# Patient Record
Sex: FEMALE | Race: WHITE | NOT HISPANIC OR LATINO | Employment: FULL TIME | URBAN - METROPOLITAN AREA
[De-identification: names, ages, dates, MRNs, and addresses within clinical notes are randomized per-mention and may not be internally consistent; named-entity substitution may affect disease eponyms.]

---

## 2020-08-25 ENCOUNTER — APPOINTMENT (OUTPATIENT)
Dept: LAB | Age: 25
End: 2020-08-25

## 2020-08-25 ENCOUNTER — TRANSCRIBE ORDERS (OUTPATIENT)
Dept: ADMINISTRATIVE | Age: 25
End: 2020-08-25

## 2020-08-25 DIAGNOSIS — Z02.1 PHYSICAL EXAM, PRE-EMPLOYMENT: ICD-10-CM

## 2020-08-25 DIAGNOSIS — Z02.1 PHYSICAL EXAM, PRE-EMPLOYMENT: Primary | ICD-10-CM

## 2020-08-25 LAB — RUBV IGG SERPL IA-ACNC: 67.5 IU/ML

## 2020-08-25 PROCEDURE — 86762 RUBELLA ANTIBODY: CPT

## 2020-08-25 PROCEDURE — 86765 RUBEOLA ANTIBODY: CPT

## 2020-08-25 PROCEDURE — 86480 TB TEST CELL IMMUN MEASURE: CPT

## 2020-08-25 PROCEDURE — 86787 VARICELLA-ZOSTER ANTIBODY: CPT

## 2020-08-25 PROCEDURE — 86735 MUMPS ANTIBODY: CPT

## 2020-08-27 LAB
MEV IGG SER QL: NORMAL
MUV IGG SER QL: ABNORMAL
VZV IGG SER IA-ACNC: ABNORMAL

## 2020-08-28 LAB
GAMMA INTERFERON BACKGROUND BLD IA-ACNC: 0.03 IU/ML
M TB IFN-G BLD-IMP: NEGATIVE
M TB IFN-G CD4+ BCKGRND COR BLD-ACNC: -0.01 IU/ML
M TB IFN-G CD4+ BCKGRND COR BLD-ACNC: 0 IU/ML
MITOGEN IGNF BCKGRD COR BLD-ACNC: >10 IU/ML

## 2020-12-22 ENCOUNTER — IMMUNIZATIONS (OUTPATIENT)
Dept: FAMILY MEDICINE CLINIC | Facility: HOSPITAL | Age: 25
End: 2020-12-22

## 2020-12-22 DIAGNOSIS — Z23 ENCOUNTER FOR IMMUNIZATION: ICD-10-CM

## 2020-12-22 PROCEDURE — 91300 SARS-COV-2 / COVID-19 MRNA VACCINE (PFIZER-BIONTECH) 30 MCG: CPT

## 2020-12-22 PROCEDURE — 0001A SARS-COV-2 / COVID-19 MRNA VACCINE (PFIZER-BIONTECH) 30 MCG: CPT

## 2021-01-11 ENCOUNTER — TELEPHONE (OUTPATIENT)
Dept: PSYCHIATRY | Facility: CLINIC | Age: 26
End: 2021-01-11

## 2021-01-12 ENCOUNTER — IMMUNIZATIONS (OUTPATIENT)
Dept: FAMILY MEDICINE CLINIC | Facility: HOSPITAL | Age: 26
End: 2021-01-12

## 2021-01-12 DIAGNOSIS — Z23 ENCOUNTER FOR IMMUNIZATION: ICD-10-CM

## 2021-01-12 PROCEDURE — 0002A SARS-COV-2 / COVID-19 MRNA VACCINE (PFIZER-BIONTECH) 30 MCG: CPT

## 2021-01-12 PROCEDURE — 91300 SARS-COV-2 / COVID-19 MRNA VACCINE (PFIZER-BIONTECH) 30 MCG: CPT

## 2021-02-25 ENCOUNTER — HOSPITAL ENCOUNTER (EMERGENCY)
Facility: HOSPITAL | Age: 26
Discharge: HOME | End: 2021-02-25
Attending: EMERGENCY MEDICINE
Payer: COMMERCIAL

## 2021-02-25 VITALS
OXYGEN SATURATION: 100 % | RESPIRATION RATE: 20 BRPM | WEIGHT: 250 LBS | HEART RATE: 90 BPM | DIASTOLIC BLOOD PRESSURE: 86 MMHG | SYSTOLIC BLOOD PRESSURE: 164 MMHG | TEMPERATURE: 98.7 F

## 2021-02-25 DIAGNOSIS — F32.A DEPRESSION: Primary | ICD-10-CM

## 2021-02-25 DIAGNOSIS — F41.9 ANXIETY: ICD-10-CM

## 2021-02-25 LAB
ATRIAL RATE: 104 BPM
P AXIS: 42 DEGREES
PR INTERVAL: 136 MS
QRS AXIS: 21 DEGREES
QRSD INTERVAL: 92 MS
QT INTERVAL: 326 MS
QTC INTERVAL: 428 MS
T WAVE AXIS: 57 DEGREES
VENTRICULAR RATE: 104 BPM

## 2021-02-25 PROCEDURE — 99284 EMERGENCY DEPT VISIT MOD MDM: CPT | Performed by: EMERGENCY MEDICINE

## 2021-02-25 PROCEDURE — 99284 EMERGENCY DEPT VISIT MOD MDM: CPT

## 2021-02-25 PROCEDURE — 93005 ELECTROCARDIOGRAM TRACING: CPT

## 2021-02-25 PROCEDURE — 93010 ELECTROCARDIOGRAM REPORT: CPT | Performed by: INTERNAL MEDICINE

## 2021-02-25 RX ORDER — DIAZEPAM 2 MG/1
2 TABLET ORAL ONCE
Status: COMPLETED | OUTPATIENT
Start: 2021-02-25 | End: 2021-02-25

## 2021-02-25 RX ORDER — ACETAMINOPHEN 325 MG/1
650 TABLET ORAL ONCE
Status: DISCONTINUED | OUTPATIENT
Start: 2021-02-25 | End: 2021-02-25 | Stop reason: HOSPADM

## 2021-02-25 RX ADMIN — DIAZEPAM 2 MG: 2 TABLET ORAL at 09:16

## 2021-02-25 NOTE — ED NOTES
Pt reports feeling sad and anxious on a daily basis  No previous psych hx  Pt lives with her girlfriend and works full time  She denies any D&A use  Pt denies si, hi or hallucinations  Pt is interested in outpatient psychiatry and therapy  Will provide her with outpatient psych resources  Pt is agreeable to return to the ed if she has any thoughts of self harm
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
## 2021-04-12 ENCOUNTER — TELEPHONE (OUTPATIENT)
Dept: PSYCHIATRY | Facility: CLINIC | Age: 26
End: 2021-04-12

## 2021-05-04 ENCOUNTER — TELEPHONE (OUTPATIENT)
Dept: PSYCHIATRY | Facility: CLINIC | Age: 26
End: 2021-05-04

## 2021-05-04 NOTE — TELEPHONE ENCOUNTER
Pt called looking to set up w/ a psychiatrist  I informed pt  call KALINA Braswell to put in a referral in the sytem   Once in I will forward information to Intake

## 2021-05-05 ENCOUNTER — OFFICE VISIT (OUTPATIENT)
Dept: INTERNAL MEDICINE CLINIC | Facility: CLINIC | Age: 26
End: 2021-05-05

## 2021-05-05 ENCOUNTER — TELEPHONE (OUTPATIENT)
Dept: PSYCHIATRY | Facility: CLINIC | Age: 26
End: 2021-05-05

## 2021-05-05 ENCOUNTER — PATIENT OUTREACH (OUTPATIENT)
Dept: INTERNAL MEDICINE CLINIC | Facility: CLINIC | Age: 26
End: 2021-05-05

## 2021-05-05 VITALS
SYSTOLIC BLOOD PRESSURE: 115 MMHG | WEIGHT: 259.8 LBS | TEMPERATURE: 97.4 F | DIASTOLIC BLOOD PRESSURE: 76 MMHG | HEART RATE: 86 BPM | BODY MASS INDEX: 43.28 KG/M2 | HEIGHT: 65 IN

## 2021-05-05 DIAGNOSIS — Z13.1 SCREENING FOR DIABETES MELLITUS: ICD-10-CM

## 2021-05-05 DIAGNOSIS — E66.01 CLASS 3 SEVERE OBESITY DUE TO EXCESS CALORIES WITH BODY MASS INDEX (BMI) OF 40.0 TO 44.9 IN ADULT, UNSPECIFIED WHETHER SERIOUS COMORBIDITY PRESENT (HCC): ICD-10-CM

## 2021-05-05 DIAGNOSIS — F32.2 CURRENT SEVERE EPISODE OF MAJOR DEPRESSIVE DISORDER WITHOUT PSYCHOTIC FEATURES, UNSPECIFIED WHETHER RECURRENT (HCC): Primary | ICD-10-CM

## 2021-05-05 DIAGNOSIS — Z78.9 NEED FOR COMMUNITY RESOURCE: ICD-10-CM

## 2021-05-05 PROBLEM — F32.9 MAJOR DEPRESSIVE DISORDER WITH CURRENT ACTIVE EPISODE: Status: ACTIVE | Noted: 2021-05-05

## 2021-05-05 PROBLEM — A49.02 MRSA INFECTION: Status: ACTIVE | Noted: 2021-05-05

## 2021-05-05 PROBLEM — F90.9 ADHD: Status: ACTIVE | Noted: 2021-05-05

## 2021-05-05 PROBLEM — F41.9 SEVERE ANXIETY: Status: ACTIVE | Noted: 2021-05-05

## 2021-05-05 PROCEDURE — 99203 OFFICE O/P NEW LOW 30 MIN: CPT | Performed by: INTERNAL MEDICINE

## 2021-05-05 RX ORDER — PAROXETINE 10 MG/1
10 TABLET, FILM COATED ORAL DAILY
Qty: 30 TABLET | Refills: 2 | Status: SHIPPED | OUTPATIENT
Start: 2021-05-05 | End: 2021-06-30 | Stop reason: DRUGHIGH

## 2021-05-05 RX ORDER — PAROXETINE 10 MG/1
10 TABLET, FILM COATED ORAL DAILY
Qty: 30 TABLET | Refills: 2 | Status: SHIPPED | OUTPATIENT
Start: 2021-05-05 | End: 2021-05-05

## 2021-05-05 NOTE — PROGRESS NOTES
ASIYA had received an in person referral from Alondra Chris DO in regards to outpatient mental health services for this patient  Dr Holly Jeffries asked ASIYA to meet with the patient in person  Dr Holly Jeffries informed ASIYA the patient had been in the ER on 2/25 for depression and has been trying to get into [address] [place] [address] E [phone]  ASIYA had completed a chart review  As per patient's chart, a referral for psychiatry needs to be made by Dr Holly BRADEN informed Dr Holly Jeffries about the need for a referral for psychiatry  Dr Holly Jeffries stated he would make the referral     ASIYA had met with the patient in person  ASIYA had asked the patient how she was doing  Patient stated she is doing okay  ASIYA informed the patient that Dr Holly Jeffries will be putting in a referral for psychiatry so this should help her out with scheduling with [facility]  ALYSHA offered to call with the patient in the office to ensure this referral for services is complete  Patient is agreeable  ALYSHA had called [address] [place] [address] E, Intake Rep [phone] for services  ALYSHA spoke with Rose who took down the patient's information for services  Rose stated she would send an in basket message to intake and they will follow up with the referral for services  Patient had asked ALYSHA for a printed out list of other [address] [place] [address] E locations  ALYSHA had printed out the list of the other offices  ALYSHA had wrote down the contact information to [facility] [phone] as they also take the [employer]  [facility] provided the patient with the number to [address] if she is needs immediate help  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

## 2021-05-05 NOTE — TELEPHONE ENCOUNTER
Marissa Vizcaino called and would like to set up an apt for Sariah she said she just got out of the ED and is looking to set up a follow up with us can you please give Shaina Ma a call thank you

## 2021-05-05 NOTE — PROGRESS NOTES
INTERNAL MEDICINE OFFICE VISIT  [facility]  [address] [place], [place] [address]    NAME: Nick Orozco  AGE: 27 y o  SEX: female    DATE OF ENCOUNTER: 5/5/2021    Assessment and Plan     1  Current severe episode of major depressive disorder without psychotic features, unspecified whether recurrent ([address] )  The patient has active severe MDD with suicidal ideation, and needs urgent psychiatric assistance  I will begin  Antidepressant treatment with plans for very close follow-up  The clinic's social workers will also be closely involved with this case  - Ambulatory referral to Psychiatry-->social work provided her with numbers to follow-up and establish care  - Initiating PARoxetine (PAXIL) 10 mg QD given its favorable adverse effect profile--> will follow-up in 5 weeks to see how she is feeling and uptitrate the medication if necessary  ·  the patient was informed that paroxetine is teratogenic; she has no desires of becoming pregnant and is okay with this  -  Crisis hotline number was provided to the patient if she ever needs emergent help    2  Class 3 severe obesity due to excess calories with body mass index (BMI) of 40 0 to 44 9 in adult, unspecified whether serious comorbidity present [facility])  The patient has attempted dietary modifications in the past, but has had difficulties in maintaining them and losing weight  She would like more resources and is open to considering bariatric surgery if eligible  - Ambulatory referral to Weight Management; Future    3  Screening for diabetes mellitus    The patient requested to be screened for diabetes  She is at higher risk diabetes given family history and likely metabolic syndrome  Will screen with hemoglobin A1c and follow-up  - HEMOGLOBIN A1C W/ EAG ESTIMATION; Future    4   Need for community resource    Considering her difficulties in establishing care with a psychiatrist, I will have her meet with our clinic social work staff help assist her moving forward  - Ambulatory referral to social work care management program  - Crisis hotline number was provided to the patient; Social workers are planning on reaching out to her by phone regularly over the next few days to weeks to followup    Orders Placed This Encounter   Procedures    HEMOGLOBIN A1C W/ [address] Ambulatory referral to social work care management program    Ambulatory referral to Weight Management    Ambulatory referral to Psychiatry     Depression Screening Follow-up Plan: Patient's depression screening was positive with a PHQ-2 score of 6  Their PHQ-9 score was 24  Continue regular follow-up with their psychologist/therapist/psychiatrist who is managing their mental health condition(s)  Patient with active suicidal ideations  Patient was kept calm in the office as 911 was called to take patient to the hospital for crisis evaluation  Patient advised to follow-up with PCP for further management  Chief Complaint     Chief Complaint   Patient presents with    New Patient Visit       History of Present Illness      The patient is a 23-year-old female with a PMH of ADHD and MRSA infection when she was a child who presents to the clinic to establish as a new patient  Her biggest issue currently is severe depression  Over the past several months, her depression has become to the point that it interferes with her daily life and she has passive suicidal thoughts involving the end of her life without any plan or inclination of acting it out  She denies experiencing any auditory hallucinations, paranoia, or psychotic features  There is a large and severe anxiety component that she experiences as well  She recently was seen in the ED in February, but deferred being admitted to inpatient psychiatry with the hope of following outpatient    Since then she has had no success in establishing care with a psychiatrist, often hitting difficulties of providers being fully booked or her having to wait for 3 months or more  She is also concerned about her weight, she has trouble maintaining her diet and would like more options to help achieve a better BMI  She has a history of ADHD, but has not been on any medications since she was a child  She used the be a chronic tobacco smoker, but quit 1 month ago and uses a vaping pen as an oral fixation aide to help curb her cravings  (the pens do not have any nicotine in them)  The following portions of the patient's history were reviewed and updated as appropriate: allergies, current medications, past family history, past medical history, past social history, past surgical history and problem list     Review of Systems     Review of Systems   Constitutional: Negative for chills and fever  HENT: Negative for ear pain and sore throat  Eyes: Negative for pain and visual disturbance  Respiratory: Negative for cough and shortness of breath  Cardiovascular: Negative for chest pain and palpitations  Gastrointestinal: Negative for abdominal pain and vomiting  Genitourinary: Negative for dysuria and hematuria  Musculoskeletal: Negative for arthralgias and back pain  Skin: Negative for color change and rash  Neurological: Negative for seizures and syncope  Psychiatric/Behavioral: Positive for decreased concentration, dysphoric mood, sleep disturbance and suicidal ideas  Negative for agitation, behavioral problems, confusion, hallucinations and self-injury  The patient is nervous/anxious  The patient is not hyperactive  All other systems reviewed and are negative        Active Problem List     Patient Active Problem List   Diagnosis    Major depressive disorder with current active episode    Class 3 severe obesity in adult ([facility] [address] )    Severe anxiety    ADHD    MRSA infection       Objective     /76 (BP Location: Left arm, Patient Position: Sitting, Cuff Size: Standard)   Pulse 86   Temp (!) 97 4 °F (36 3 °C) (Temporal)   Ht 5' 5" (1 651 m)   Wt 118 kg (259 lb 12 8 oz)   BMI 43 23 kg/m²     Physical Exam  Vitals signs reviewed  Constitutional:       General: She is not in acute distress  Appearance: Normal appearance  She is obese  She is not ill-appearing  HENT:      Head: Normocephalic and atraumatic  Nose: No congestion  Mouth/Throat:      Mouth: Mucous membranes are moist       Pharynx: Oropharynx is clear  Eyes:      General: No scleral icterus  Conjunctiva/sclera: Conjunctivae normal    Neck:      Musculoskeletal: Neck supple  No muscular tenderness  Cardiovascular:      Rate and Rhythm: Normal rate and regular rhythm  Pulses: Normal pulses  Heart sounds: No murmur  No gallop  Pulmonary:      Effort: Pulmonary effort is normal       Breath sounds: Normal breath sounds  Abdominal:      General: Bowel sounds are normal  There is no distension  Palpations: Abdomen is soft  Tenderness: There is no abdominal tenderness  Musculoskeletal:         General: No swelling or tenderness  Right lower leg: No edema  Left lower leg: No edema  Lymphadenopathy:      Cervical: No cervical adenopathy  Skin:     General: Skin is warm  Coloration: Skin is not pale  Findings: No erythema or rash  Neurological:      General: No focal deficit present  Mental Status: She is alert and oriented to person, place, and time  Psychiatric:         Mood and Affect: Mood is depressed  Affect is labile  Behavior: Behavior normal          Thought Content: Thought content includes suicidal (very passive, has thoughts of life ending but never thinks of her acting on it nor a plan) ideation  Thought content does not include homicidal or suicidal plan           Judgment: Judgment normal          Pertinent Laboratory/Diagnostic Studies:  CBC: No results found for: WBC, RBC, HGB, HCT, MCV, MCH, MCHC, RDW, MPV, PLT, NRBC, NEUTOPHILPCT, LYMPHOPCT, MONOPCT, EOSPCT, BASOPCT, NEUTROABS, LYMPHSABS, MONOSABS, EOSABS, MONOSABS  Chemistry Profile: No results found for: NA, K, CL, CO2, ANIONGAP, BUN, CREATININE, GLUC, GLUF, GLUCOSE, CALCIUM, CORRECTEDCA, MG, PHOS, AST, ALT, ALKPHOS, PROT, BILITOT, EGFR  Coagulation Studies: No results found for: PROTIME, INR, PTT  Cardiac Studies: No results found for: NTBNP, BNP, TROPONINI, POCTROP  Hepatology: No results found for: LIPASE, AMMONIA, AFP  Endocrine Studies: No results found for: HGBA1C, [id number], T3FREE, L0YPHIX, FREET4, THYMICROANTI, THGAB, TRIG, CHOL, CHOLESTEROL, HDL, LDLC, LDLCALC, LDL, LDLDIRECT, NONHDLC, [id number], PTH, [id number]  Iron Studies: No results found for: LABIRON, IRON, TIBC, FERRITIN  Health Maintenance: No results found for: PSA, HEPCAB  Toxicology: No results found for: AMPHETUR, BARBTUR, BDZUR, COCAINEUR, COCAINEUR, OPIATEUR, PCPUR, THCUR, ETOH, ACTMNPHEN, SALICYLATE      Current Medications     Current Outpatient Medications:     PARoxetine (PAXIL) 10 mg tablet, Take 1 tablet (10 mg total) by mouth daily, Disp: 30 tablet, Rfl: 2    Health Maintenance     Health Maintenance   Topic Date Due    Pneumococcal Vaccine: Pediatrics (0 to 5 Years) and At-Risk Patients (6 to 59 Years) (1 of 1 - PPSV23) Never done    Depression Remission PHQ  Never done    HIV Screening  Never done    BMI: Followup Plan  Never done    Annual Physical  Never done    DTaP,Tdap,and Td Vaccines (1 - Tdap) Never done    Cervical Cancer Screening  Never done    BMI: Adult  05/05/2022    Influenza Vaccine  Completed    COVID-19 Vaccine  Completed    HIB Vaccine  Aged Out    Hepatitis B Vaccine  Aged Out    IPV Vaccine  Aged Out    Hepatitis A Vaccine  Aged Out    Meningococcal ACWY Vaccine  Aged Out    HPV Vaccine  Aged Out     Immunization History   Administered Date(s) Administered    Influenza, injectable, quadrivalent, preservative free 0 5 mL 11/23/2020    SARS-CoV-2 / COVID-19 mRNA IM ([employer]) 12/22/2020, 01/12/2021       Elias Garcia DO  Internal Medicine  PGY-1  5/5/2021 4:57 PM

## 2021-05-07 ENCOUNTER — TELEPHONE (OUTPATIENT)
Dept: PSYCHIATRY | Facility: CLINIC | Age: 26
End: 2021-05-07

## 2021-05-07 NOTE — TELEPHONE ENCOUNTER
Behavorial Health Outpatient Intake Questions    Referred by: PCP    Please advised interviewee that they need to answer all questions truthfully to allow for best care and any misrepresentations of information may affect their ability to be seen at this clinic   => Was this discussed? Yes     [facility]     Presenting Problem (in patient's words):   Patient states has been struggling with severe depression and anxiety  Are there any developmental disabilities? ? If yes, can they speak to you on the phone? If they are too limited to speak to you on phone, refer out Yes   ADHD (at a younger age)    Are you taking any psychiatric medications? Yes    => If yes, who prescribes? If yes, are they injectable medications? PARoxetine (PAXIL) 10 mg tablet       Does the patient have a language barrier or hearing impairment? No    Have you been [facility] at [facility] by a therapist or a doctor in the past? If yes, who? No    Has the patient been hospitalized for mental health? No   If yes, how long ago was last hospitalization and where was it? Do you actively use alcohol or marijuana or illegal substances? If yes, what and how much - refer out to Drug and alcohol treatment if use is excessive or daily use of illegal substances No concerns of substance abuse are reported  Do you have a community treatment team or ? No    Legal History-     Does the patient have any history of arrests, longterm/custodial time, or DUIs? No  If Yes-  1) What types of charges? 2) When were they last incarcerated? 3) Are they currently on parole or probation? Minor Child-    Who has custody of the child? Is there a custody agreement? If there is a custody agreement remind parent that they must bring a copy to the first appt or they will not be seen       Intake Team, please check with provider before scheduling if flags come up such as:  - complex case  - legal history (other than ANA)  - communication barrier concerns are present  - if, in your judgment, this needs further review    ACCEPTED as a patient Yes  => Appointment Date: 06/30/2021 at 3:00 p m with Rivka Coma    Referred Elsewhere? No    Name of Insurance Co: [address] [place] [address] ID# [id number]  [id number] Phone #  If ins is primary or secondary  If patient is a minor, parents information such as Name, D  O B of guarantor

## 2021-05-10 ENCOUNTER — APPOINTMENT (OUTPATIENT)
Dept: LAB | Facility: HOSPITAL | Age: 26
End: 2021-05-10
Payer: COMMERCIAL

## 2021-05-10 ENCOUNTER — TRANSCRIBE ORDERS (OUTPATIENT)
Dept: LAB | Facility: HOSPITAL | Age: 26
End: 2021-05-10

## 2021-05-10 DIAGNOSIS — Z13.1 SCREENING FOR DIABETES MELLITUS: ICD-10-CM

## 2021-05-10 DIAGNOSIS — N92.4 PREMENOPAUSAL MENORRHAGIA: Primary | ICD-10-CM

## 2021-05-10 DIAGNOSIS — L73.2 HIDRADENITIS: ICD-10-CM

## 2021-05-10 DIAGNOSIS — N92.4 PREMENOPAUSAL MENORRHAGIA: ICD-10-CM

## 2021-05-10 LAB
ALBUMIN SERPL BCP-MCNC: 3.2 G/DL (ref 3.5–5)
ALP SERPL-CCNC: 65 U/L (ref 46–116)
ALT SERPL W P-5'-P-CCNC: 27 U/L (ref 12–78)
ANION GAP SERPL CALCULATED.3IONS-SCNC: 6 MMOL/L (ref 4–13)
AST SERPL W P-5'-P-CCNC: 12 U/L (ref 5–45)
BASOPHILS # BLD AUTO: 0.03 THOUSANDS/ΜL (ref 0–0.1)
BASOPHILS NFR BLD AUTO: 0 % (ref 0–1)
BILIRUB SERPL-MCNC: 0.28 MG/DL (ref 0.2–1)
BUN SERPL-MCNC: 12 MG/DL (ref 5–25)
CALCIUM ALBUM COR SERPL-MCNC: 9.3 MG/DL (ref 8.3–10.1)
CALCIUM SERPL-MCNC: 8.7 MG/DL (ref 8.3–10.1)
CHLORIDE SERPL-SCNC: 109 MMOL/L (ref 100–108)
CO2 SERPL-SCNC: 25 MMOL/L (ref 21–32)
CREAT SERPL-MCNC: 0.69 MG/DL (ref 0.6–1.3)
EOSINOPHIL # BLD AUTO: 0.27 THOUSAND/ΜL (ref 0–0.61)
EOSINOPHIL NFR BLD AUTO: 3 % (ref 0–6)
ERYTHROCYTE [DISTWIDTH] IN BLOOD BY AUTOMATED COUNT: 12.2 % (ref 11.6–15.1)
EST. AVERAGE GLUCOSE BLD GHB EST-MCNC: 91 MG/DL
FSH SERPL-ACNC: 5.8 MIU/ML
GFR SERPL CREATININE-BSD FRML MDRD: 119 ML/MIN/1.73SQ M
GLUCOSE P FAST SERPL-MCNC: 108 MG/DL (ref 65–99)
HBA1C MFR BLD: 4.8 %
HCT VFR BLD AUTO: 40.8 % (ref 34.8–46.1)
HGB BLD-MCNC: 13.6 G/DL (ref 11.5–15.4)
IMM GRANULOCYTES # BLD AUTO: 0.03 THOUSAND/UL (ref 0–0.2)
IMM GRANULOCYTES NFR BLD AUTO: 0 % (ref 0–2)
LH SERPL-ACNC: 10.6 MIU/ML
LYMPHOCYTES # BLD AUTO: 2.81 THOUSANDS/ΜL (ref 0.6–4.47)
LYMPHOCYTES NFR BLD AUTO: 32 % (ref 14–44)
MCH RBC QN AUTO: 29.3 PG (ref 26.8–34.3)
MCHC RBC AUTO-ENTMCNC: 33.3 G/DL (ref 31.4–37.4)
MCV RBC AUTO: 88 FL (ref 82–98)
MONOCYTES # BLD AUTO: 0.48 THOUSAND/ΜL (ref 0.17–1.22)
MONOCYTES NFR BLD AUTO: 6 % (ref 4–12)
NEUTROPHILS # BLD AUTO: 5.11 THOUSANDS/ΜL (ref 1.85–7.62)
NEUTS SEG NFR BLD AUTO: 59 % (ref 43–75)
NRBC BLD AUTO-RTO: 0 /100 WBCS
PLATELET # BLD AUTO: 332 THOUSANDS/UL (ref 149–390)
PMV BLD AUTO: 9.8 FL (ref 8.9–12.7)
POTASSIUM SERPL-SCNC: 3.8 MMOL/L (ref 3.5–5.3)
PROLACTIN SERPL-MCNC: 8.9 NG/ML
PROT SERPL-MCNC: 6.6 G/DL (ref 6.4–8.2)
RBC # BLD AUTO: 4.64 MILLION/UL (ref 3.81–5.12)
SODIUM SERPL-SCNC: 140 MMOL/L (ref 136–145)
WBC # BLD AUTO: 8.73 THOUSAND/UL (ref 4.31–10.16)

## 2021-05-10 PROCEDURE — 83001 ASSAY OF GONADOTROPIN (FSH): CPT

## 2021-05-10 PROCEDURE — 80053 COMPREHEN METABOLIC PANEL: CPT

## 2021-05-10 PROCEDURE — 82154 ANDROSTANEDIOL GLUCURONIDE: CPT

## 2021-05-10 PROCEDURE — 82627 DEHYDROEPIANDROSTERONE: CPT

## 2021-05-10 PROCEDURE — 84270 ASSAY OF SEX HORMONE GLOBUL: CPT

## 2021-05-10 PROCEDURE — 83036 HEMOGLOBIN GLYCOSYLATED A1C: CPT

## 2021-05-10 PROCEDURE — 84403 ASSAY OF TOTAL TESTOSTERONE: CPT

## 2021-05-10 PROCEDURE — 85025 COMPLETE CBC W/AUTO DIFF WBC: CPT

## 2021-05-10 PROCEDURE — 83002 ASSAY OF GONADOTROPIN (LH): CPT

## 2021-05-10 PROCEDURE — 84146 ASSAY OF PROLACTIN: CPT

## 2021-05-10 PROCEDURE — 83498 ASY HYDROXYPROGESTERONE 17-D: CPT

## 2021-05-10 PROCEDURE — 84402 ASSAY OF FREE TESTOSTERONE: CPT

## 2021-05-10 PROCEDURE — 82157 ASSAY OF ANDROSTENEDIONE: CPT

## 2021-05-10 PROCEDURE — 36415 COLL VENOUS BLD VENIPUNCTURE: CPT

## 2021-05-11 ENCOUNTER — PATIENT OUTREACH (OUTPATIENT)
Dept: INTERNAL MEDICINE CLINIC | Facility: CLINIC | Age: 26
End: 2021-05-11

## 2021-05-11 LAB
DHEA-S SERPL-MCNC: 206 UG/DL (ref 84.8–378)
SHBG SERPL-SCNC: 30.6 NMOL/L (ref 24.6–122)
TESTOST FREE SERPL-MCNC: 0.7 PG/ML (ref 0–4.2)
TESTOST SERPL-MCNC: 18 NG/DL (ref 13–71)

## 2021-05-11 NOTE — PROGRESS NOTES
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

## 2021-05-14 LAB — ANDROST SERPL-MCNC: 54 NG/DL (ref 41–262)

## 2021-05-16 LAB — 17OHP SERPL-MCNC: 11 NG/DL

## 2021-05-18 LAB — 3A-DIOL G SER-MCNC: 265 NG/DL

## 2021-05-26 ENCOUNTER — HOSPITAL ENCOUNTER (EMERGENCY)
Facility: HOSPITAL | Age: 26
Discharge: HOME | End: 2021-05-26
Attending: EMERGENCY MEDICINE
Payer: COMMERCIAL

## 2021-05-26 VITALS
RESPIRATION RATE: 18 BRPM | OXYGEN SATURATION: 97 % | BODY MASS INDEX: 42.61 KG/M2 | HEIGHT: 65 IN | WEIGHT: 255.73 LBS | DIASTOLIC BLOOD PRESSURE: 58 MMHG | HEART RATE: 65 BPM | SYSTOLIC BLOOD PRESSURE: 117 MMHG | TEMPERATURE: 97.9 F

## 2021-05-26 DIAGNOSIS — S61.419A HAND LACERATION: Primary | ICD-10-CM

## 2021-05-26 PROCEDURE — 99282 EMERGENCY DEPT VISIT SF MDM: CPT

## 2021-05-26 PROCEDURE — 99282 EMERGENCY DEPT VISIT SF MDM: CPT | Performed by: EMERGENCY MEDICINE

## 2021-05-26 NOTE — ED ATTENDING ATTESTATION
Final Diagnosis:  1  Hand laceration           IOpal MD, saw and evaluated the patient  All available labs and X-rays were ordered by me or the resident and have been reviewed by myself  I discussed the patient with the resident / non-physician and agree with the resident's / non-physician practitioner's findings and plan as documented in the resident's / non-physician practicitioner's note, except where noted  At this point, I agree with the current assessment done in the ED  I was present during key portions of all procedures performed unless otherwise stated  Chief Complaint   Patient presents with    Wrist Laceration     Patient reports that over the weekend she cut her right wrist on a roshni bedframe; reports that the wound is not healing well and bending her wrist is very painful     This is a 27 y o  female presenting for evaluation of laceration  She on the weekend was taking apart a bed frame  Had suffered superficial laceration of the RIGHT wrist    4cm long  Not gaping  Healing by itself  She's covering it with neosporin  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 so came in for evaluation  No f/ch/n/v  No hx of cellulitis  Tetanus is up to date     PMH:   has no past medical history on file  PSH:   has no past surgical history on file      Social:  Social History     Substance and Sexual Activity   Alcohol Use Yes    Comment: occ     Social History     Tobacco Use   Smoking Status Current Every Day Smoker    Packs/day: 0 50    Types: Cigarettes   Smokeless Tobacco Never Used   Tobacco Comment    stoped smoking cigarettes 1 month ago     Social History     Substance and Sexual Activity   Drug Use Yes    Types: Marijuana    Comment: occ     PE:  Vitals:    05/26/21 1223   BP: 117/58   BP Location: Right arm   Pulse: 65   Resp: 18   Temp: 97 9 °F (36 6 °C)   TempSrc: Tympanic   SpO2: 97%   Weight: 116 kg (255 lb 11 7 oz)   Height: 5' 5" (1 651 m)   General: VS reviewed  Appears in NAD  awake, alert  Well-nourished, well-developed  Appears stated age  Speaking normally in full sentences  Head: Normocephalic, atraumatic  Eyes: EOM-I  No diplopia  No hyphema  No subconjunctival hemorrhages  Symmetrical lids  ENT: Atraumatic external nose and ears  MMM  No malocclusion  No stridor  Normal phonation  No drooling  Normal swallowing  Neck: No JVD  CV: No pallor noted  Lungs:   No tachypnea  No respiratory distress  MSK:   FROM spontaneously  Skin: Dry  Laceration is present though healing by secondary intent with scab overlying  Localized redness exactly at the incision site  Ranging wrist normally  Not hot to the touch  Neuro: Awake, alert, GCS15, CN II-XII grossly intact  Motor grossly intact  Psychiatric/Behavioral: Appropriate mood and affect   Exam: deferred  A:  - wound check  P:  - supportive care  - advised keeping covered  - DC     - 13 point ROS was performed and all are normal unless stated in the history above  - Nursing note reviewed  Vitals reviewed  - Orders placed by myself and/or advanced practitioner / resident     - Previous chart was reviewed  - No language barrier    - History obtained from patient  - There are no limitations to the history obtained  - Critical care time: Not applicable for this patient  Code Status: No Order  Advance Directive and Living Will:      Power of :    POLST:      Medications - No data to display  No orders to display     No orders of the defined types were placed in this encounter      Labs Reviewed - No data to display  Time reflects when diagnosis was documented in both MDM as applicable and the Disposition within this note     Time User Action Codes Description Comment    5/26/2021 12:45 PM Cole Cano Add [A07 030L] Hand laceration     5/26/2021 12:45 PM Cole Cano Modify [X47 [id number]] Hand laceration right      ED Disposition     ED Disposition Condition Date/Time Comment Discharge Stable Wed May 26, 2021 12:44 PM Oval Smoke discharge to home/self care  Follow-up Information     Follow up With Specialties Details Why Contact Info Additional Information    Infolink  Call  As needed to establish primary care [facility] Internal Medicine Call  As needed, to establish primary care [address] [facility] [address] [facility] [address]  [facility] [address], [address] [address], [facility], [place], [place], [address] [phone]        Patient's Medications   Discharge Prescriptions    No medications on file     No discharge procedures on file  Prior to Admission Medications   Prescriptions Last Dose Informant Patient Reported? Taking? PARoxetine (PAXIL) 10 mg tablet   No No   Sig: Take 1 tablet (10 mg total) by mouth daily      Facility-Administered Medications: None       Portions of the record may have been created with voice recognition software  Occasional wrong word or "sound a like" substitutions may have occurred due to the inherent limitations of voice recognition software  Read the chart carefully and recognize, using context, where substitutions have occurred      Electronically signed by:  Eitan Nguyen

## 2021-05-26 NOTE — ED PROVIDER NOTES
History  Chief Complaint   Patient presents with    Wrist Laceration     Patient reports that over the weekend she cut her right wrist on a roshni bedframe; reports that the wound is not healing well and bending her wrist is very painful     26 y/o female presents to the ED c/o right wrist laceration sustained 2-3 days ago  She states that she was helping a friend move when she accidentally cut her right wrist on a roshni bedframe  She notes that the wound does not appear to be healing well and causes discomfort when she bends her wrist  No other injuries or complaints  Tetanus up to date  Prior to Admission Medications   Prescriptions Last Dose Informant Patient Reported? Taking? PARoxetine (PAXIL) 10 mg tablet   No No   Sig: Take 1 tablet (10 mg total) by mouth daily      Facility-Administered Medications: None       History reviewed  No pertinent past medical history  History reviewed  No pertinent surgical history  Family History   Problem Relation Age of Onset    No Known Problems Mother     Prostate cancer Father     Diabetes Father     Depression Father     Anxiety disorder Father     Depression Sister     Anxiety disorder Sister      I have reviewed and agree with the history as documented  E-Cigarette/Vaping    E-Cigarette Use Current Every Day User      E-Cigarette/Vaping Substances    Nicotine No     THC No     CBD No     Flavoring Yes     Other No     Unknown No      Social History     Tobacco Use    Smoking status: Current Every Day Smoker     Packs/day: 0 50     Types: Cigarettes    Smokeless tobacco: Never Used    Tobacco comment: stoped smoking cigarettes 1 month ago   Substance Use Topics    Alcohol use: Yes     Comment: occ    Drug use: Yes     Types: Marijuana     Comment: occ        Review of Systems   Constitutional: Negative for chills and fever  Musculoskeletal: Negative for back pain, joint swelling and neck pain     Skin: Positive for wound (Right wrist)  Negative for rash  Neurological: Negative for weakness and numbness  All other systems reviewed and are negative  Physical Exam  ED Triage Vitals [05/26/21 1223]   Temperature Pulse Respirations Blood Pressure SpO2   97 9 °F (36 6 °C) 65 18 117/58 97 %      Temp Source Heart Rate Source Patient Position - Orthostatic VS BP Location FiO2 (%)   Tympanic Monitor Sitting Right arm --      Pain Score       7             Orthostatic Vital Signs  Vitals:    05/26/21 1223   BP: 117/58   Pulse: 65   Patient Position - Orthostatic VS: Sitting       Physical Exam  Vitals signs and nursing note reviewed  Constitutional:       General: She is not in acute distress  Appearance: Normal appearance  She is normal weight  HENT:      Head: Normocephalic and atraumatic  Right Ear: External ear normal       Left Ear: External ear normal       Nose: Nose normal    Eyes:      Extraocular Movements: Extraocular movements intact  Pupils: Pupils are equal, round, and reactive to light  Neck:      Musculoskeletal: Normal range of motion and neck supple  Cardiovascular:      Comments: Appears well perfused  2+ radial pulses bilaterally  Pulmonary:      Effort: Pulmonary effort is normal  No respiratory distress  Abdominal:      General: Abdomen is flat  There is no distension  Musculoskeletal: Normal range of motion  General: No swelling or tenderness  Comments: 3 cm superficial laceration to the proximal palm of the right hand, just distal to the right wrist  The wound appears several days old, currently healing, with small faint erythema surrounding the wound edges  No bleeding or discharge  Neurovascularly intact  FROM of the right wrist without joint effusion  Skin:     General: Skin is warm and dry  Capillary Refill: Capillary refill takes less than 2 seconds  Neurological:      General: No focal deficit present        Mental Status: She is alert and oriented to person, place, and time  Psychiatric:         Mood and Affect: Mood normal          Behavior: Behavior normal          ED Medications  Medications - No data to display    Diagnostic Studies  Results Reviewed     None                 No orders to display         Procedures  Procedures      ED Course                                       MDM  Number of Diagnoses or Management Options  Hand laceration:   Diagnosis management comments: 26 y/o female presents to the ED c/o right wrist laceration sustained 2-3 days ago  She states that she was helping a friend move when she accidentally cut her right wrist on a roshni bedframe  She notes that the wound does not appear to be healing well and causes discomfort when she bends her wrist  No other injuries or complaints  Tetanus up to date  On exam she is afebrile, VSS, with a 3 cm superficial laceration to the proximal palm of the right hand, just distal to the right wrist  The wound appears several days old, currently healing, with small faint erythema surrounding the wound edges  No bleeding or discharge  Neurovascularly intact  FROM of the right wrist without joint effusion  Remainder of exam is non-contributory  Old superficial wound, no indications to repair  Tetanus up to date  Discussed findings, red flags, return precautions, and outpatient follow up and the patient understands and agrees  Disposition  Final diagnoses:   Hand laceration - right     Time reflects when diagnosis was documented in both MDM as applicable and the Disposition within this note     Time User Action Codes Description Comment    5/26/2021 12:45 PM Peewee Hearn Add [K80 963I] Hand laceration     5/26/2021 12:45 PM Peewee Hearn Modify [[id number]] Hand laceration right      ED Disposition     ED Disposition Condition Date/Time Comment    Discharge Stable Wed May 26, 2021 12:44 PM Any Cordon discharge to home/self care              Follow-up Information     Follow up With Specialties Details Why Contact Info Additional Information    Infolink  Call  As needed to establish primary care [facility] Call  As needed, to establish primary care [address] [address]  [facility] [address], [address] [place] [address], [place], [place], [address] [phone]          Discharge Medication List as of 5/26/2021 12:46 PM      CONTINUE these medications which have NOT CHANGED    Details   PARoxetine (PAXIL) 10 mg tablet Take 1 tablet (10 mg total) by mouth daily, Starting Wed 5/5/2021, Normal           No discharge procedures on file  PDMP Review     None           ED Provider  Attending physically available and evaluated Daphnie Montaño I managed the patient along with the ED Attending      Electronically Signed by         Graciela Winters MD  06/01/21 5299

## 2021-06-30 ENCOUNTER — TELEPHONE (OUTPATIENT)
Dept: PSYCHIATRY | Facility: CLINIC | Age: 26
End: 2021-06-30

## 2021-06-30 ENCOUNTER — OFFICE VISIT (OUTPATIENT)
Dept: PSYCHIATRY | Facility: CLINIC | Age: 26
End: 2021-06-30
Payer: COMMERCIAL

## 2021-06-30 DIAGNOSIS — F32.2 CURRENT SEVERE EPISODE OF MAJOR DEPRESSIVE DISORDER WITHOUT PSYCHOTIC FEATURES, UNSPECIFIED WHETHER RECURRENT (HCC): ICD-10-CM

## 2021-06-30 PROCEDURE — 90791 PSYCH DIAGNOSTIC EVALUATION: CPT | Performed by: NURSE PRACTITIONER

## 2021-06-30 RX ORDER — PAROXETINE HYDROCHLORIDE 20 MG/1
30 TABLET, FILM COATED ORAL DAILY
Qty: 45 TABLET | Refills: 1 | Status: SHIPPED | OUTPATIENT
Start: 2021-06-30 | End: 2021-08-02 | Stop reason: DRUGHIGH

## 2021-06-30 NOTE — BH TREATMENT PLAN
TREATMENT PLAN (Medication Management Only)        [facility]    Name and Date of Birth:  Lizzie Acosta 27 y o  1995  Date of Treatment Plan: June 30, 2021  Diagnosis/Diagnoses:    1  Current severe episode of major depressive disorder without psychotic features, unspecified whether recurrent [facility])      Strengths/Personal Resources for Self-Care: taking medications as prescribed, motivation for treatment  Area/Areas of need (in own words): unstable mood  1  Long Term Goal: alleviate depression  Target Date:6 months - 12/30/2021  Person/Persons responsible for completion of goal: Sariah  2  Short Term Objective (s) - How will we reach this goal?:   A  Provider new recommended medication/dosage changes and/or continue medication(s): continue current medications as prescribed Paxil  B  Attend medication management appointments regularly  C  See therapist   Target Date:6 months - 12/30/2021  Person/Persons Responsible for Completion of Goal: Sariah  Progress Towards Goals: starting treatment  Treatment Modality: medication management every 4 weeks, referral for individual psychotherapy, medication education at every visit  Review due 180 days from date of this plan: 6 months - 12/30/2021  Expected length of service: ongoing treatment  My Physician/PA/NP and I have developed this plan together and I agree to work on the goals and objectives  I understand the treatment goals that were developed for my treatment
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
## 2021-08-02 ENCOUNTER — OFFICE VISIT (OUTPATIENT)
Dept: PSYCHIATRY | Facility: CLINIC | Age: 26
End: 2021-08-02
Payer: COMMERCIAL

## 2021-08-02 DIAGNOSIS — F32.2 CURRENT SEVERE EPISODE OF MAJOR DEPRESSIVE DISORDER WITHOUT PSYCHOTIC FEATURES, UNSPECIFIED WHETHER RECURRENT (HCC): Primary | ICD-10-CM

## 2021-08-02 DIAGNOSIS — F41.9 SEVERE ANXIETY: ICD-10-CM

## 2021-08-02 PROCEDURE — 99214 OFFICE O/P EST MOD 30 MIN: CPT | Performed by: NURSE PRACTITIONER

## 2021-08-02 RX ORDER — PAROXETINE HYDROCHLORIDE 40 MG/1
40 TABLET, FILM COATED ORAL EVERY MORNING
Qty: 30 TABLET | Refills: 2 | Status: SHIPPED | OUTPATIENT
Start: 2021-08-02 | End: 2021-09-15 | Stop reason: DRUGHIGH

## 2021-08-02 NOTE — PSYCH
Regular Visit    Problem List Items Addressed This Visit        Other    Major depressive disorder with current active episode - Primary    Relevant Medications    PARoxetine (PAXIL) 40 MG tablet    Severe anxiety             Encounter provider SHAUNA Carrera    Provider located at   [address] [address] [phone]    Recent Visits  No visits were found meeting these conditions  Showing recent visits within past 7 days and meeting all other requirements  Future Appointments  No visits were found meeting these conditions  Showing future appointments within next 150 days and meeting all other requirements       HPI     Current Outpatient Medications   Medication Sig Dispense Refill    PARoxetine (PAXIL) 40 MG tablet Take 1 tablet (40 mg total) by mouth every morning 30 tablet 2     No current facility-administered medications for this visit  Review of Systems    I spent 30 minutes directly with the patient during this visit      [address] [place] [address]    Name and Date of Birth:  Bryan Mcgregor 27 y o  1995 MRN: [id number]    Date of Visit: August 2, 2021    No Known Allergies  SUBJECTIVE:    Debi Landers is seen today for a follow up for Major Depressive Disorder  She continues to improve since the last visit  Sariah fritz seen by this provider on 06/30/2021  She is bright, cooperative in conversation  She reports that she is doing good and she has noticed a good change in her mood  She relates that she has joined a [employer] recently as well and is on a diet  She is still eating, but is controlling what she eats  She also feels more rested and has been sleeping more, getting at least 8 hours per night  She does continue to have some restlessness and has noticed some anxiety    When she does become anxious, her leg chucho  She reports that her depression as 6/10, anxiety is 5/10  She has been more able to handle her stress and has noticed that she does not argue as much and has decreased irritability  She does have negative thoughts when she becomes anxious, which she would like to continue to work on decreasing  A referral was made for therapy she is continuing to wait on a call  We will increase her Paxil to 40 mg p o  daily  Follow-up  in 6 weeks  She denies any side effects from current psychiatric medications  PLAN:  Increase Paxil to 40mg PO daily  Follow up in 6 weeks  She will call if concerns or issues arise prior to scheduled appointment  Aware of 24 hour and weekend coverage for urgent situations accessed by calling [facility] main practice number  Referral for individual psychotherapy  Medication management every 6 weeks  Aware of need to follow up with family physician for medical issues    Diagnoses and all orders for this visit:    Current severe episode of major depressive disorder without psychotic features, unspecified whether recurrent (HCC)  -     PARoxetine (PAXIL) 40 MG tablet; Take 1 tablet (40 mg total) by mouth every morning    Severe anxiety        Current Outpatient Medications on File Prior to Visit   Medication Sig Dispense Refill    [DISCONTINUED] PARoxetine (PAXIL) 20 mg tablet Take 1 5 tablets (30 mg total) by mouth daily 45 tablet 1     No current facility-administered medications on file prior to visit  Psychotherapy Provided:     Individual psychotherapy provided: Yes  Supportive counseling provided  Medication changes discussed with Sariah  Medication education provided to NARGIS SCHWARZ  Coping skills reviewed with Sariah  Educated on importance of medication and treatment compliance  Reassurance and supportive therapy provided  Crisis/safety plan discussed with Sariah DAS Appetite Changes and Sleep:     She reports adequate number of sleep hours (8 hours), decreased appetite, exercising and dieting, normal energy level   Patient denies suicidal or homicidal ideation    Review Of Systems:    HPI ROS:               Medication Side Effects:  denies     Depression (10 worst): 6/10 (Was increased)   Anxiety (10 worst): 5/10 (Was increased)   Safety concerns (SI, HI, etc): denies (Was passive SI)   Sleep: 8 hours/night (Was 11 hrs/night)   Energy: good (Was low)   Appetite: decreased (Was increased)     General normal    Personality no change in personality   Constitutional negative   ENT negative   Cardiovascular negative   Respiratory negative   Gastrointestinal negative   Genitourinary negative   Musculoskeletal negative   Integumentary negative   Neurological negative   Endocrine negative   Other Symptoms none, all other systems are negative     Mental Status Evaluation:    Appearance Adequate hygiene and grooming   Behavior calm and cooperative and friendly   Mood euthymic  Depression Scale - 6 of 10 (0 = No depression)  Anxiety Scale - 5 of 10 (0 = No anxiety)   Speech Normal rate and volume   Affect mood-congruent   Thought Processes Goal directed and coherent   Thought Content Does not verbalize delusional material   Associations Tightly connected   Perceptual Disturbances Denies hallucinations and does not appear to be responding to internal stimuli   Risk Potential Suicidal/Homicidal Ideation - No evidence of suicidal or homicidal ideation and patient does not verbalize suicidal or homicidal ideation  Risk of Violence - No evidence of risk for violence found on assessment  Risk of Self Mutilation - No evidence of risk for self mutilation found on assessment   Orientation oriented to person, place, time/date and situation   Memory recent and remote memory grossly intact   Consciousness alert and awake   Attention/Concentration attention span and concentration are age appropriate   Insight intact   Judgement intact   Muscle Strength and Gait normal muscle strength and normal muscle tone, normal gait/station and normal balance   Motor Activity no abnormal movements   Language no difficulty naming common objects, no difficulty repeating a phrase, no difficulty writing a sentence   Fund of Knowledge adequate knowledge of current events  adequate fund of knowledge regarding past history  adequate fund of knowledge regarding vocabulary      Past Psychiatric History Update:     Inpatient Psychiatric Admission Since Last Encounter:   no  Changes to Outpatient Psychiatric Treatment Team:    no  Suicide Attempt Or Self Mutilation Since Last Encounter:   no  Incidence of Violent Behavior Since Last Encounter:   no    Traumatic History Update:     New Onset of Abuse Since Last Encounter:   no  Traumatic Events Since Last Encounter:   no    Past Medical History:    Past Medical History:   Diagnosis Date    ADHD (attention deficit hyperactivity disorder)      Past Medical History Pertinent Negatives:   Diagnosis Date Noted    Head injury 2021    [facility]    [facility] attempt ([facility] ) 2021     History reviewed  No pertinent surgical history    No Known Allergies  Substance Abuse History:    Social History     Substance and Sexual Activity   Alcohol Use Yes    Alcohol/week: 1 0 standard drinks    Types: 1 Shots of liquor per week    Comment: one drink/week     Social History     Substance and Sexual Activity   Drug Use Yes    Frequency: 21 0 times per week    Types: Marijuana    Comment: smokes 3-4 times/day     Social History:    Social History     Socioeconomic History    Marital status: Single     Spouse name: Not on file    Number of children: 0    Years of education: some college    Highest education level: 12th grade   Occupational History    Not on file   Tobacco Use    Smoking status: Former Smoker     Packs/day: 0 50     Types: Cigarettes     Quit date: 3/30/2021     Years since quittin 3    Smokeless tobacco: Never Used    Tobacco comment: stoped smoking cigarettes 1 month ago   Vaping Use    Vaping Use: Some days    Substances: Flavoring   Substance and Sexual Activity    Alcohol use: Yes     Alcohol/week: 1 0 standard drinks     Types: 1 Shots of liquor per week     Comment: one drink/week    Drug use: Yes     Frequency: 21 0 times per week     Types: Marijuana     Comment: smokes 3-4 times/day    Sexual activity: Not Currently   Other Topics Concern    Not on file   Social History Narrative    Not on file     Social Determinants of Health     Financial Resource Strain: Low Risk     Difficulty of Paying Living Expenses: Not hard at all   Food Insecurity: No Food Insecurity    Worried About Running Out of Food in the Last Year: Never true    Jesusita of Food in the Last Year: Never true   Transportation Needs: No Transportation Needs    Lack of Transportation (Medical): No    Lack of Transportation (Non-Medical): No   Physical Activity: Inactive    Days of Exercise per Week: 0 days    Minutes of Exercise per Session: 0 min   Stress: Stress Concern Present    Feeling of Stress : To some extent   Social Connections:     Frequency of Communication with Friends and Family:     Frequency of Social Gatherings with Friends and Family:     Attends Worship Services:     Active Member of Clubs or Organizations:     Attends Club or Organization Meetings:     Marital Status:    Intimate Partner Violence: Not At Risk    Fear of Current or Ex-Partner: No    Emotionally Abused: No    Physically Abused: No    Sexually Abused: No     Family Psychiatric History:     Family History   Problem Relation Age of Onset    No Known Problems Mother     Prostate cancer Father     Diabetes Father     Depression Father     Anxiety disorder Father     Depression Sister     Anxiety disorder Sister      History Review: The following portions of the patient's history were reviewed and updated as appropriate: allergies, current medications, past family history, past medical history, past social history, past surgical history and problem list     OBJECTIVE:     Vital signs in last 24 hours: There were no vitals filed for this visit  Laboratory Results:   Recent Labs (last 2 months):   No visits with results within 2 Month(s) from this visit     Latest known visit with results is:   Appointment on 05/10/2021   Component Date Value    Hemoglobin A1C 05/10/2021 4 8     EAG 05/10/2021 91     WBC 05/10/2021 8 73     RBC 05/10/2021 4 64     Hemoglobin 05/10/2021 13 6     Hematocrit 05/10/2021 40 8     MCV 05/10/2021 88     MCH 05/10/2021 29 3     MCHC 05/10/2021 33 3     RDW 05/10/2021 12 2     MPV 05/10/2021 9 8     Platelets 38/58/9240 332     nRBC 05/10/2021 0     Neutrophils Relative 05/10/2021 59     Immat GRANS % 05/10/2021 0     Lymphocytes Relative 05/10/2021 32     Monocytes Relative 05/10/2021 6     Eosinophils Relative 05/10/2021 3     Basophils Relative 05/10/2021 0     Neutrophils Absolute 05/10/2021 5 11     Immature Grans Absolute 05/10/2021 0 03     Lymphocytes Absolute 05/10/2021 2 81     Monocytes Absolute 05/10/2021 0 48     Eosinophils Absolute 05/10/2021 0 27     Basophils Absolute 05/10/2021 0 03     Sodium 05/10/2021 140     Potassium 05/10/2021 3 8     Chloride 05/10/2021 109*    CO2 05/10/2021 25     ANION GAP 05/10/2021 6     BUN 05/10/2021 12     Creatinine 05/10/2021 0 69     Glucose, Fasting 05/10/2021 108*    Calcium 05/10/2021 8 7     Corrected Calcium 05/10/2021 9 3     AST 05/10/2021 12     ALT 05/10/2021 27     Alkaline Phosphatase 05/10/2021 65     Total Protein 05/10/2021 6 6     Albumin 05/10/2021 3 2*    Total Bilirubin 05/10/2021 0 28     eGFR 05/10/2021 119     Androstenedione 05/10/2021 54     Testosterone, Free 05/10/2021 0 7     TESTOSTERONE TOTAL 05/10/2021 18     DHEA-SO4 05/10/2021 206 0     Prolactin 05/10/2021 8 9     17-OH PROGESTERONE 05/10/2021 11     LH 05/10/2021 10 6     FSH 05/10/2021 5 8     Sex Hormone Binding 05/10/2021 30 6     3 Alpha-17B Androstenedi* 05/10/2021 265*     I have personally reviewed all pertinent laboratory/tests results  Suicide/Homicide Risk Assessment:    Risk of Harm to Self:  The following ratings are based on assessment at the time of the interview  Protective Factors: no current suicidal ideation, access to mental health treatment, compliant with medications, compliant with mental health treatment, having a desire to be alive, stable living environment, stable job  Based on today's Audkassidy Diaz presents the following risk of harm to self: minimal    Risk of Harm to Others: The following ratings are based on assessment at the time of the interview  Protective Factors: no current homicidal ideation  Based on today's assessment, Mirella Lerma presents the following risk of harm to others: none    The following interventions are recommended: no intervention changes needed    Medications Risks/Benefits:      Risks, Benefits And Possible Side Effects Of Medications:    Discussed risks and benefits of treatment with patient including risk of suicidality, serotonin syndrome and SIADH related to treatment with antidepressants; Risk of induction of manic symptoms in certain patient populations     Controlled Medication Discussion:     No records found for controlled prescriptions according to [place] Prescription Drug Monitoring Program    Treatment Plan:    Due for update/Updated:   no  Last treatment plan done 6/30/21 by SHAUNA Law  Treatment Plan due on 12/30/21  SHAUNA Tyler 08/02/21    This note was shared with patient

## 2021-09-10 ENCOUNTER — APPOINTMENT (OUTPATIENT)
Dept: LAB | Facility: HOSPITAL | Age: 26
End: 2021-09-10

## 2021-09-10 DIAGNOSIS — Z00.8 HEALTH EXAMINATION IN POPULATION SURVEY: ICD-10-CM

## 2021-09-10 LAB
CHOLEST SERPL-MCNC: 143 MG/DL (ref 50–200)
EST. AVERAGE GLUCOSE BLD GHB EST-MCNC: 91 MG/DL
HBA1C MFR BLD: 4.8 %
HDLC SERPL-MCNC: 43 MG/DL
LDLC SERPL CALC-MCNC: 68 MG/DL (ref 0–100)
NONHDLC SERPL-MCNC: 100 MG/DL
TRIGL SERPL-MCNC: 158 MG/DL

## 2021-09-10 PROCEDURE — 80061 LIPID PANEL: CPT

## 2021-09-10 PROCEDURE — 36415 COLL VENOUS BLD VENIPUNCTURE: CPT

## 2021-09-10 PROCEDURE — 83036 HEMOGLOBIN GLYCOSYLATED A1C: CPT

## 2021-09-15 ENCOUNTER — TELEMEDICINE (OUTPATIENT)
Dept: PSYCHIATRY | Facility: CLINIC | Age: 26
End: 2021-09-15
Payer: COMMERCIAL

## 2021-09-15 DIAGNOSIS — F32.2 CURRENT SEVERE EPISODE OF MAJOR DEPRESSIVE DISORDER WITHOUT PSYCHOTIC FEATURES, UNSPECIFIED WHETHER RECURRENT (HCC): Primary | ICD-10-CM

## 2021-09-15 PROCEDURE — 99214 OFFICE O/P EST MOD 30 MIN: CPT | Performed by: NURSE PRACTITIONER

## 2021-09-15 RX ORDER — TRAZODONE HYDROCHLORIDE 50 MG/1
25 TABLET ORAL
Qty: 30 TABLET | Refills: 0 | Status: SHIPPED | OUTPATIENT
Start: 2021-09-15

## 2021-09-15 RX ORDER — PAROXETINE 30 MG/1
30 TABLET, FILM COATED ORAL EVERY MORNING
Qty: 30 TABLET | Refills: 1 | Status: SHIPPED | OUTPATIENT
Start: 2021-09-15 | End: 2022-02-04 | Stop reason: DRUGHIGH

## 2021-09-15 NOTE — PSYCH
Virtual Regular Visit    Verification of patient location:    Patient is located in the following state in which I hold an active license [place]    Problem List Items Addressed This Visit        Other    Major depressive disorder with current active episode - Primary    Relevant Medications    PARoxetine (PAXIL) 30 mg tablet    traZODone (DESYREL) 50 mg tablet             Encounter provider SHAUNA Raygoza    Provider located at   [address] [address] [phone]    Recent Visits  No visits were found meeting these conditions  Showing recent visits within past 7 days and meeting all other requirements  Future Appointments  No visits were found meeting these conditions  Showing future appointments within next 150 days and meeting all other requirements         The patient was identified by name and date of birth  Gini Arenas was informed that this is a telemedicine visit and that the visit is being conducted [employer] and patient was informed that this is a secure, HIPAA-compliant platform  She agrees to proceed     My office door was closed  No one else was in the room  She acknowledged consent and understanding of privacy and security of the video platform  The patient has agreed to participate and understands they can discontinue the visit at any time  Patient is aware this is a billable service  HPI     Current Outpatient Medications   Medication Sig Dispense Refill    PARoxetine (PAXIL) 30 mg tablet Take 1 tablet (30 mg total) by mouth every morning 30 tablet 1    traZODone (DESYREL) 50 mg tablet Take 0 5 tablets (25 mg total) by mouth daily at bedtime 30 tablet 0     No current facility-administered medications for this visit  Review of Systems  Video Exam    There were no vitals filed for this visit      Physical Exam   As a result of this visit, I have referred the patient for further respiratory evaluation  No    I spent 25 minutes directly with the patient during this visit  [address] [facility] acknowledges that she has consented to an online visit or consultation  She understands that the online visit is based solely on information provided by her, and that, in the absence of a face-to-face physical evaluation by the physician, the diagnosis she receives is both limited and provisional in terms of accuracy and completeness  This is not intended to replace a full medical face-to-face evaluation by the physician  Mann Fuller understands and accepts these terms  MEDICATION MANAGEMENT NOTE        [facility]    Name and Date of Birth:  Mann Fuller 27 y o  1995 MRN: [id number]    Date of Visit: September 15, 2021    No Known Allergies  SUBJECTIVE:    Agata Clement is seen today for a follow up for Major Depressive Disorder  She continues to experience on and off anxiety symptoms since the last visit  Sariah last seen by this provider on 8/2/21  At that time her paxil was increased to 40mg PO daily  Today she reports that she "does not think the 40mg of paxil is right" and states that she has been more irritable and both she and her partner have noticed a difference in her attitude  She states that her sleep fluctuates and she could get 3-4 hours or she could get 12 hours, but she always has trouble falling asleep due to anxiety and racing thoughts  She states it takes her approximately 2 hours to fall asleep most nights  She states that she did try some trazodone provided to her by her father, which helps her to fall asleep but she also feels groggy in the morning after taking it  She did tell this provider that she is taking 100mg of the trazodone    We discussed the need to speak to her provider prior to changing her medications, and this provider let her know that she should have started out at a smaller dose, such as 25mg PO HS  She was in agreement and will trial the trazodone 25mg to start  She reports that she believes her depression at this time is related to her situation with her girlfriend  She states that she is thinking about leaving her, and it makes her happy to imagine her life in that capacity  She currently rates her depression at 7/10 and anxiety at 10/10  If anxiety continues, we will investigate supplementing the paxil with buspar, and will reassess at next appointment  She denies SI and HI, denies auditory or visual hallucinations  She reports work is going very well  She is staying active and walking approximately 2 hours/day, which she states makes her feel better and helps her sleep better  We will reduce paxil back to 30mg PO daily and initiate trazodone 25mg PO HS  She will follow up in mid-October, as she has a vacation planned in the beginning of October  She denies any side effects from current psychiatric medications  PLAN:  Reduce paxil to 30mg PO daily  Initiate trazodone 25mg PO HS  Follow up in one month  She will call sooner if concerns or issues arise prior to scheduled appointment  She has a referral in for a therapist and is awaiting a call   Aware of 24 hour and weekend coverage for urgent situations accessed by calling [facility] main practice number  Referral for individual psychotherapy  Medication management every 4 weeks  Aware of need to follow up with family physician for medical issues    Diagnoses and all orders for this visit:    Current severe episode of major depressive disorder without psychotic features, unspecified whether recurrent (HCC)  -     PARoxetine (PAXIL) 30 mg tablet; Take 1 tablet (30 mg total) by mouth every morning  -     traZODone (DESYREL) 50 mg tablet;  Take 0 5 tablets (25 mg total) by mouth daily at bedtime        Current Outpatient Medications on File Prior to Visit   Medication Sig Dispense Refill    [DISCONTINUED] PARoxetine (PAXIL) 40 MG tablet Take 1 tablet (40 mg total) by mouth every morning 30 tablet 2     No current facility-administered medications on file prior to visit  Psychotherapy Provided:     Individual psychotherapy provided: Yes  Supportive counseling provided  Medication changes discussed with Sariah  Medication education provided to NARGIS SCHWARZ  Recent stressor including relationship problems discussed with Sariah  Importance of medication and treatment compliance reviewed with Sariah  Importance of follow up with family physician for medical issues reviewed with Sariah  Reassurance and supportive therapy provided  Crisis/safety plan discussed with Sariah  HPI ROS Appetite Changes and Sleep:     She reports difficulty falling asleep, normal appetite, normal energy level   Patient denies suicidal or homicidal ideation    Review Of Systems:    HPI ROS:               Medication Side Effects:  denies     Depression (10 worst): 7/10 (Was 6/10)   Anxiety (10 worst): 10/10 (Was 5/10)   Safety concerns (SI, HI, etc): denies (Was denies)   Sleep: Difficulty initiating, fluctuating sleep patterns (Was 8 hrs/night)   Energy: good (Was good)   Appetite: Good, making healthier decisions (Was decreased)     General normal    Personality change in personality, irritability, possibly from the paxil increased dose   Constitutional negative   ENT negative   Cardiovascular negative   Respiratory negative   Gastrointestinal negative   Genitourinary negative   Musculoskeletal negative   Integumentary negative   Neurological negative   Endocrine negative   Other Symptoms none, all other systems are negative     Mental Status Evaluation:    Appearance Adequate hygiene and grooming   Behavior calm and cooperative and friendly   Mood anxious and depressed  Depression Scale - 7 of 10 (0 = No depression)  Anxiety Scale - 10 of 10 (0 = No anxiety)   Speech Normal rate and volume   Affect appropriate and mood-congruent   Thought Processes Goal directed and coherent   Thought Content Does not verbalize delusional material   Associations Tightly connected   Perceptual Disturbances Denies hallucinations and does not appear to be responding to internal stimuli   Risk Potential Suicidal/Homicidal Ideation - No evidence of suicidal or homicidal ideation and patient does not verbalize suicidal or homicidal ideation  Risk of Violence - No evidence of risk for violence found on assessment  Risk of Self Mutilation - No evidence of risk for self mutilation found on assessment   Orientation oriented to person, place, time/date and situation   Memory recent and remote memory grossly intact   Consciousness alert and awake   Attention/Concentration attention span and concentration are age appropriate   Insight intact   Judgement partial   Muscle Strength and Gait normal muscle strength and normal muscle tone, normal gait/station and normal balance   Motor Activity unable to assess today due to virtual visit   Language no difficulty naming common objects, no difficulty repeating a phrase, no difficulty writing a sentence   Fund of Knowledge adequate knowledge of current events  adequate fund of knowledge regarding past history  adequate fund of knowledge regarding vocabulary      Past Psychiatric History Update:     Inpatient Psychiatric Admission Since Last Encounter:   no  Changes to Outpatient Psychiatric Treatment Team:    no  Suicide Attempt Or Self Mutilation Since Last Encounter:   no  Incidence of Violent Behavior Since Last Encounter:   no    Traumatic History Update:     New Onset of Abuse Since Last Encounter:   no  Traumatic Events Since Last Encounter:   no    Past Medical History:    Past Medical History:   Diagnosis Date    ADHD (attention deficit hyperactivity disorder)      Past Medical History Pertinent Negatives:   Diagnosis Date Noted    Head injury 06/30/2021    [facility]    [facility] attempt ([facility] ) 2021     No past surgical history on file  No Known Allergies  Substance Abuse History:    Social History     Substance and Sexual Activity   Alcohol Use Yes    Alcohol/week: 1 0 standard drinks    Types: 1 Shots of liquor per week    Comment: one drink/week     Social History     Substance and Sexual Activity   Drug Use Yes    Frequency: 21 0 times per week    Types: Marijuana    Comment: smokes 3-4 times/day     Social History:    Social History     Socioeconomic History    Marital status: Single     Spouse name: Not on file    Number of children: 0    Years of education: some college    Highest education level: 12th grade   Occupational History    Not on file   Tobacco Use    Smoking status: Former Smoker     Packs/day: 0 50     Types: Cigarettes     Quit date: 3/30/2021     Years since quittin 4    Smokeless tobacco: Never Used    Tobacco comment: stoped smoking cigarettes 1 month ago   Vaping Use    Vaping Use: Some days    Substances: Flavoring   Substance and Sexual Activity    Alcohol use: Yes     Alcohol/week: 1 0 standard drinks     Types: 1 Shots of liquor per week     Comment: one drink/week    Drug use: Yes     Frequency: 21 0 times per week     Types: Marijuana     Comment: smokes 3-4 times/day    Sexual activity: Not Currently   Other Topics Concern    Not on file   Social History Narrative    Not on file     Social Determinants of Health     Financial Resource Strain: Low Risk     Difficulty of Paying Living Expenses: Not hard at all   Food Insecurity: No Food Insecurity    Worried About Running Out of Food in the Last Year: Never true    Jesusita of Food in the Last Year: Never true   Transportation Needs: No Transportation Needs    Lack of Transportation (Medical): No    Lack of Transportation (Non-Medical):  No   Physical Activity: Inactive    Days of Exercise per Week: 0 days    Minutes of Exercise per Session: 0 min Stress: Stress Concern Present    Feeling of Stress : To some extent   Social Connections:     Frequency of Communication with Friends and Family:     Frequency of Social Gatherings with Friends and Family:     Attends [employer] Services:     Active Member of Clubs or Organizations:     Attends Club or Organization Meetings:     Marital Status:    Intimate Partner Violence: Not At Risk    Fear of Current or Ex-Partner: No    Emotionally Abused: No    Physically Abused: No    Sexually Abused: No     Family Psychiatric History:     Family History   Problem Relation Age of Onset    No Known Problems Mother     Prostate cancer Father     Diabetes Father     Depression Father     Anxiety disorder Father     Depression Sister     Anxiety disorder Sister      History Review: The following portions of the patient's history were reviewed and updated as appropriate: allergies, current medications, past family history, past medical history, past social history, past surgical history and problem list     OBJECTIVE:     Vital signs in last 24 hours: There were no vitals filed for this visit  Laboratory Results:   Recent Labs (last 2 months):   Appointment on 09/10/2021   Component Date Value    Hemoglobin A1C 09/10/2021 4 8     EAG 09/10/2021 91     Cholesterol 09/10/2021 143     Triglycerides 09/10/2021 158*    HDL, Direct 09/10/2021 43     LDL Calculated 09/10/2021 68     Non-HDL-Chol (CHOL-HDL) 09/10/2021 100      I have personally reviewed all pertinent laboratory/tests results      Suicide/Homicide Risk Assessment:    Risk of Harm to Self:  The following ratings are based on assessment at the time of the interview  Protective Factors: no current suicidal ideation, access to mental health treatment, compliant with medications, compliant with mental health treatment, having a desire to be alive  Based on today's assessment, Sparkle Lechuga presents the following risk of harm to self: none    Risk of Harm to Others: The following ratings are based on assessment at the time of the interview  Protective Factors: no current homicidal ideation  Based on today's assessment, Paul Rodarte presents the following risk of harm to others: none    The following interventions are recommended: no intervention changes needed    Medications Risks/Benefits:      Risks, Benefits And Possible Side Effects Of Medications:    Discussed risks and benefits of treatment with patient including risk of suicidality, serotonin syndrome and SIADH related to treatment with antidepressants; Risk of induction of manic symptoms in certain patient populations and risk of impaired next-day mental alertness, complex sleep-related behavior and dependence related to treatment with hypnotic medications     Controlled Medication Discussion:     No records found for controlled prescriptions according to [facility]    Treatment Plan:    Due for update/Updated:   no  Last treatment plan done 6/30/21 by SHAUNA Davison  Treatment Plan due on 12/30/21  SHAUNA Davalos 09/15/21    This note was shared with patient

## 2021-10-19 ENCOUNTER — OFFICE VISIT (OUTPATIENT)
Dept: FAMILY MEDICINE CLINIC | Facility: CLINIC | Age: 26
End: 2021-10-19
Payer: COMMERCIAL

## 2021-10-19 VITALS
HEART RATE: 88 BPM | WEIGHT: 251 LBS | SYSTOLIC BLOOD PRESSURE: 120 MMHG | RESPIRATION RATE: 16 BRPM | TEMPERATURE: 99.5 F | HEIGHT: 64 IN | DIASTOLIC BLOOD PRESSURE: 80 MMHG | BODY MASS INDEX: 42.85 KG/M2

## 2021-10-19 DIAGNOSIS — F41.9 SEVERE ANXIETY: ICD-10-CM

## 2021-10-19 DIAGNOSIS — F32.2 CURRENT SEVERE EPISODE OF MAJOR DEPRESSIVE DISORDER WITHOUT PSYCHOTIC FEATURES, UNSPECIFIED WHETHER RECURRENT (HCC): ICD-10-CM

## 2021-10-19 DIAGNOSIS — Z12.4 SCREENING FOR CERVICAL CANCER: ICD-10-CM

## 2021-10-19 DIAGNOSIS — Z23 ENCOUNTER FOR IMMUNIZATION: ICD-10-CM

## 2021-10-19 DIAGNOSIS — Z00.00 ANNUAL PHYSICAL EXAM: Primary | ICD-10-CM

## 2021-10-19 DIAGNOSIS — E66.01 MORBID OBESITY WITH BMI OF 40.0-44.9, ADULT (HCC): ICD-10-CM

## 2021-10-19 PROCEDURE — 90686 IIV4 VACC NO PRSV 0.5 ML IM: CPT

## 2021-10-19 PROCEDURE — 90471 IMMUNIZATION ADMIN: CPT

## 2021-10-19 PROCEDURE — 99385 PREV VISIT NEW AGE 18-39: CPT | Performed by: FAMILY MEDICINE

## 2021-12-02 ENCOUNTER — TELEPHONE (OUTPATIENT)
Dept: FAMILY MEDICINE CLINIC | Facility: CLINIC | Age: 26
End: 2021-12-02

## 2021-12-13 ENCOUNTER — OFFICE VISIT (OUTPATIENT)
Dept: BARIATRICS | Facility: CLINIC | Age: 26
End: 2021-12-13

## 2021-12-13 VITALS
SYSTOLIC BLOOD PRESSURE: 100 MMHG | WEIGHT: 252 LBS | DIASTOLIC BLOOD PRESSURE: 72 MMHG | HEIGHT: 65 IN | HEART RATE: 82 BPM | TEMPERATURE: 98.2 F | BODY MASS INDEX: 41.99 KG/M2

## 2021-12-13 DIAGNOSIS — Z01.818 PREOPERATIVE CLEARANCE: Primary | ICD-10-CM

## 2021-12-13 DIAGNOSIS — E66.01 MORBID OBESITY WITH BMI OF 40.0-44.9, ADULT (HCC): ICD-10-CM

## 2021-12-13 DIAGNOSIS — Z72.0 NICOTINE ABUSE: ICD-10-CM

## 2021-12-13 DIAGNOSIS — Z01.812 BLOOD TESTS PRIOR TO TREATMENT OR PROCEDURE: ICD-10-CM

## 2021-12-13 DIAGNOSIS — E66.01 MORBID OBESITY (HCC): Primary | ICD-10-CM

## 2021-12-13 DIAGNOSIS — Z72.0 TOBACCO ABUSE: ICD-10-CM

## 2021-12-13 PROCEDURE — RECHECK

## 2021-12-14 ENCOUNTER — TELEPHONE (OUTPATIENT)
Dept: FAMILY MEDICINE CLINIC | Facility: CLINIC | Age: 26
End: 2021-12-14

## 2021-12-14 ENCOUNTER — TELEPHONE (OUTPATIENT)
Dept: PSYCHIATRY | Facility: CLINIC | Age: 26
End: 2021-12-14

## 2021-12-17 ENCOUNTER — OFFICE VISIT (OUTPATIENT)
Dept: CARDIAC SURGERY | Facility: CLINIC | Age: 26
End: 2021-12-17
Payer: COMMERCIAL

## 2021-12-17 VITALS
HEIGHT: 65 IN | HEART RATE: 77 BPM | DIASTOLIC BLOOD PRESSURE: 82 MMHG | OXYGEN SATURATION: 98 % | WEIGHT: 250 LBS | BODY MASS INDEX: 41.65 KG/M2 | SYSTOLIC BLOOD PRESSURE: 132 MMHG

## 2021-12-17 DIAGNOSIS — E66.01 MORBID OBESITY (HCC): ICD-10-CM

## 2021-12-17 PROCEDURE — 99203 OFFICE O/P NEW LOW 30 MIN: CPT | Performed by: INTERNAL MEDICINE

## 2021-12-17 PROCEDURE — 93000 ELECTROCARDIOGRAM COMPLETE: CPT | Performed by: INTERNAL MEDICINE

## 2021-12-17 RX ORDER — OMEGA-3S/DHA/EPA/FISH OIL/D3 300MG-1000
400 CAPSULE ORAL DAILY
COMMUNITY

## 2021-12-17 RX ORDER — CHLORAL HYDRATE 500 MG
1000 CAPSULE ORAL DAILY
COMMUNITY

## 2021-12-17 RX ORDER — GLUCOSAMINE/D3/BOSWELLIA SERRA 1500MG-400
TABLET ORAL
COMMUNITY

## 2021-12-20 ENCOUNTER — DOCUMENTATION (OUTPATIENT)
Dept: PSYCHIATRY | Facility: CLINIC | Age: 26
End: 2021-12-20

## 2021-12-27 ENCOUNTER — IMMUNIZATIONS (OUTPATIENT)
Dept: FAMILY MEDICINE CLINIC | Facility: HOSPITAL | Age: 26
End: 2021-12-27

## 2021-12-27 DIAGNOSIS — Z23 ENCOUNTER FOR IMMUNIZATION: Primary | ICD-10-CM

## 2021-12-27 PROCEDURE — 0001A COVID-19 PFIZER VACC 0.3 ML: CPT

## 2021-12-27 PROCEDURE — 91300 COVID-19 PFIZER VACC 0.3 ML: CPT

## 2022-01-04 ENCOUNTER — APPOINTMENT (EMERGENCY)
Dept: RADIOLOGY | Facility: HOSPITAL | Age: 27
End: 2022-01-04
Payer: COMMERCIAL

## 2022-01-04 ENCOUNTER — HOSPITAL ENCOUNTER (EMERGENCY)
Facility: HOSPITAL | Age: 27
Discharge: HOME | End: 2022-01-04
Attending: EMERGENCY MEDICINE | Admitting: EMERGENCY MEDICINE
Payer: COMMERCIAL

## 2022-01-04 VITALS
TEMPERATURE: 97.5 F | HEIGHT: 66 IN | HEART RATE: 89 BPM | DIASTOLIC BLOOD PRESSURE: 75 MMHG | SYSTOLIC BLOOD PRESSURE: 138 MMHG | BODY MASS INDEX: 40.18 KG/M2 | WEIGHT: 250 LBS | OXYGEN SATURATION: 98 % | RESPIRATION RATE: 18 BRPM

## 2022-01-04 DIAGNOSIS — S09.90XA INJURY OF HEAD, INITIAL ENCOUNTER: ICD-10-CM

## 2022-01-04 DIAGNOSIS — M54.50 LOW BACK PAIN: Primary | ICD-10-CM

## 2022-01-04 DIAGNOSIS — R68.84 JAW PAIN: ICD-10-CM

## 2022-01-04 PROCEDURE — 99284 EMERGENCY DEPT VISIT MOD MDM: CPT

## 2022-01-04 PROCEDURE — 70450 CT HEAD/BRAIN W/O DYE: CPT

## 2022-01-04 PROCEDURE — 70486 CT MAXILLOFACIAL W/O DYE: CPT

## 2022-01-04 PROCEDURE — G1004 CDSM NDSC: HCPCS

## 2022-01-04 PROCEDURE — 72070 X-RAY EXAM THORAC SPINE 2VWS: CPT

## 2022-01-04 PROCEDURE — 99284 EMERGENCY DEPT VISIT MOD MDM: CPT | Performed by: PHYSICIAN ASSISTANT

## 2022-01-04 PROCEDURE — 72100 X-RAY EXAM L-S SPINE 2/3 VWS: CPT

## 2022-01-04 RX ORDER — CYCLOBENZAPRINE HCL 10 MG
10 TABLET ORAL 2 TIMES DAILY PRN
Qty: 20 TABLET | Refills: 0 | Status: SHIPPED | OUTPATIENT
Start: 2022-01-04

## 2022-01-04 NOTE — DISCHARGE INSTRUCTIONS
Return to the ER with any new or worsening of symptoms such as but not limited to increased pain, abdominal pain, abdominal bruising, blood in urine, chest pain, headaches, change in mental status, or any other concerning symptoms    Thank you for allowing us to be part of your care today

## 2022-01-04 NOTE — Clinical Note
Tierney Masters was seen and treated in our emergency department on 1/4/2022  Diagnosis:     Zulma Zamudio  may return to work on return date  She may return on this date: 01/06/2022         If you have any questions or concerns, please don't hesitate to call        Hannah Mortimer, PA-C    ______________________________           _______________          _______________  Hospital Representative                              Date                                Time
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
## 2022-01-13 ENCOUNTER — APPOINTMENT (OUTPATIENT)
Dept: LAB | Facility: HOSPITAL | Age: 27
End: 2022-01-13
Attending: SURGERY
Payer: COMMERCIAL

## 2022-01-13 DIAGNOSIS — E66.01 MORBID OBESITY WITH BMI OF 40.0-44.9, ADULT (HCC): ICD-10-CM

## 2022-01-13 DIAGNOSIS — Z72.0 NICOTINE ABUSE: ICD-10-CM

## 2022-01-13 DIAGNOSIS — Z01.818 PREOPERATIVE CLEARANCE: ICD-10-CM

## 2022-01-13 DIAGNOSIS — Z72.0 TOBACCO ABUSE: ICD-10-CM

## 2022-01-13 DIAGNOSIS — Z01.812 BLOOD TESTS PRIOR TO TREATMENT OR PROCEDURE: ICD-10-CM

## 2022-01-13 LAB
ALBUMIN SERPL BCP-MCNC: 3.6 G/DL (ref 3.5–5)
ALP SERPL-CCNC: 63 U/L (ref 46–116)
ALT SERPL W P-5'-P-CCNC: 22 U/L (ref 12–78)
ANION GAP SERPL CALCULATED.3IONS-SCNC: 5 MMOL/L (ref 4–13)
AST SERPL W P-5'-P-CCNC: 10 U/L (ref 5–45)
BILIRUB SERPL-MCNC: 0.39 MG/DL (ref 0.2–1)
BUN SERPL-MCNC: 11 MG/DL (ref 5–25)
CALCIUM SERPL-MCNC: 8.9 MG/DL (ref 8.3–10.1)
CHLORIDE SERPL-SCNC: 110 MMOL/L (ref 100–108)
CO2 SERPL-SCNC: 24 MMOL/L (ref 21–32)
CREAT SERPL-MCNC: 0.67 MG/DL (ref 0.6–1.3)
ERYTHROCYTE [DISTWIDTH] IN BLOOD BY AUTOMATED COUNT: 12.4 % (ref 11.6–15.1)
GFR SERPL CREATININE-BSD FRML MDRD: 120 ML/MIN/1.73SQ M
GLUCOSE P FAST SERPL-MCNC: 83 MG/DL (ref 65–99)
HCT VFR BLD AUTO: 42.8 % (ref 34.8–46.1)
HGB BLD-MCNC: 14.2 G/DL (ref 11.5–15.4)
MCH RBC QN AUTO: 30 PG (ref 26.8–34.3)
MCHC RBC AUTO-ENTMCNC: 33.2 G/DL (ref 31.4–37.4)
MCV RBC AUTO: 91 FL (ref 82–98)
PLATELET # BLD AUTO: 326 THOUSANDS/UL (ref 149–390)
PMV BLD AUTO: 9.9 FL (ref 8.9–12.7)
POTASSIUM SERPL-SCNC: 4 MMOL/L (ref 3.5–5.3)
PROT SERPL-MCNC: 7.2 G/DL (ref 6.4–8.2)
RBC # BLD AUTO: 4.73 MILLION/UL (ref 3.81–5.12)
SODIUM SERPL-SCNC: 139 MMOL/L (ref 136–145)
TSH SERPL DL<=0.05 MIU/L-ACNC: 0.57 UIU/ML (ref 0.36–3.74)
WBC # BLD AUTO: 8.73 THOUSAND/UL (ref 4.31–10.16)

## 2022-01-13 PROCEDURE — 85027 COMPLETE CBC AUTOMATED: CPT

## 2022-01-13 PROCEDURE — 36415 COLL VENOUS BLD VENIPUNCTURE: CPT

## 2022-01-13 PROCEDURE — 80323 ALKALOIDS NOS: CPT

## 2022-01-13 PROCEDURE — 84443 ASSAY THYROID STIM HORMONE: CPT

## 2022-01-13 PROCEDURE — 80053 COMPREHEN METABOLIC PANEL: CPT

## 2022-01-14 ENCOUNTER — OFFICE VISIT (OUTPATIENT)
Dept: BARIATRICS | Facility: CLINIC | Age: 27
End: 2022-01-14

## 2022-01-14 DIAGNOSIS — E66.01 OBESITY, CLASS III, BMI 40-49.9 (MORBID OBESITY) (HCC): Primary | ICD-10-CM

## 2022-01-14 PROCEDURE — RECHECK

## 2022-01-18 VITALS — BODY MASS INDEX: 40.64 KG/M2 | WEIGHT: 251.8 LBS

## 2022-01-19 LAB
COTININE SERPL-MCNC: 65.5 NG/ML
NICOTINE SERPL-MCNC: <1 NG/ML

## 2022-01-31 DIAGNOSIS — F32.2 CURRENT SEVERE EPISODE OF MAJOR DEPRESSIVE DISORDER WITHOUT PSYCHOTIC FEATURES, UNSPECIFIED WHETHER RECURRENT (HCC): ICD-10-CM

## 2022-01-31 RX ORDER — PAROXETINE 30 MG/1
30 TABLET, FILM COATED ORAL EVERY MORNING
Qty: 30 TABLET | Refills: 1 | OUTPATIENT
Start: 2022-01-31

## 2022-02-04 ENCOUNTER — TELEMEDICINE (OUTPATIENT)
Dept: PSYCHIATRY | Facility: CLINIC | Age: 27
End: 2022-02-04
Payer: COMMERCIAL

## 2022-02-04 DIAGNOSIS — F32.2 CURRENT SEVERE EPISODE OF MAJOR DEPRESSIVE DISORDER WITHOUT PSYCHOTIC FEATURES, UNSPECIFIED WHETHER RECURRENT (HCC): Primary | ICD-10-CM

## 2022-02-04 PROCEDURE — 99213 OFFICE O/P EST LOW 20 MIN: CPT | Performed by: NURSE PRACTITIONER

## 2022-02-04 RX ORDER — PAROXETINE HYDROCHLORIDE 40 MG/1
40 TABLET, FILM COATED ORAL EVERY MORNING
Qty: 30 TABLET | Refills: 2 | Status: SHIPPED | OUTPATIENT
Start: 2022-02-04

## 2022-02-04 NOTE — PSYCH
Virtual Regular Visit    Verification of patient location:    Patient is located in the following state in which I hold an active license [place]    Problem List Items Addressed This Visit     None             Encounter provider Chuckie Mccann    Provider located at   [address] [id number] [phone]    Recent Visits  No visits were found meeting these conditions  Showing recent visits within past 7 days and meeting all other requirements  Future Appointments  No visits were found meeting these conditions  Showing future appointments within next 150 days and meeting all other requirements         The patient was identified by name and date of birth  Any Cordon was informed that this is a telemedicine visit and that the visit is being conducted through[employer] [place] Embedded and patient was informed this is a secure, HIPAA-complaint platform  She agrees to proceed     My office door was closed  No one else was in the room  She acknowledged consent and understanding of privacy and security of the video platform  The patient has agreed to participate and understands they can discontinue the visit at any time  Patient is aware this is a billable service       HPI     Current Outpatient Medications   Medication Sig Dispense Refill    Biotin 72393 MCG TABS Take by mouth      cholecalciferol (VITAMIN D3) 400 units tablet Take 400 Units by mouth daily      cyclobenzaprine (FLEXERIL) 10 mg tablet Take 1 tablet (10 mg total) by mouth 2 (two) times a day as needed for muscle spasms 20 tablet 0    Multiple Vitamin (MULTIVITAMIN ADULT PO) Take by mouth      Omega-3 Fatty Acids (fish oil) 1,000 mg Take 1,000 mg by mouth daily      PARoxetine (PAXIL) 30 mg tablet Take 1 tablet (30 mg total) by mouth every morning 30 tablet 1    traZODone (DESYREL) 50 mg tablet Take 0 5 tablets (25 mg total) by mouth daily at bedtime (Patient taking differently: Take 25 mg by mouth daily at bedtime as needed ) 30 tablet 0     No current facility-administered medications for this visit  Review of Systems  Video Exam    There were no vitals filed for this visit  Physical Exam   As a result of this visit, I have referred the patient for further respiratory evaluation  No    I spent 25 minutes directly with the patient during this visit  [address] [facility] [address] [facility] acknowledges that she has consented to an online visit or consultation  She understands that the online visit is based solely on information provided by her, and that, in the absence of a face-to-face physical evaluation by the physician, the diagnosis she receives is both limited and provisional in terms of accuracy and completeness  This is not intended to replace a full medical face-to-face evaluation by the physician  Fredeva Whitney understands and accepts these terms  MEDICATION MANAGEMENT NOTE        [facility]    Name and Date of Birth:  Camilla Olson 27 y o  1995 MRN: [id number]    Date of Visit: February 4, 2022    No Known Allergies  SUBJECTIVE:    Merna Ferrari is seen today for a follow up for Major Depressive Disorder  She continues to do well at this point, but had fluctuated since September  Merna Ferrari was last seen by this provider on 9/15/21  She is seen virtually today  She is calm, cooperative and appropriate in conversation  She is wearing her work scrubs, 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  Makes good eye contact  She reports that she has had periods of non-compliance with her medication since seeing this provider in September  She states that she was not taking the Paxil regularly as she should have been until December 2021, and then she completely stopped taking the medication    She states that she became irritable and depressed, then began taking the meds again in January 2022   She then states that she ran out of the Paxil 30mg PO daily and states she would be set off by the "littlest things" and go into a "charu"  She explains she would become irrationally angry and realized she did need to take her medications regularly  She states that she began taking Paxil 40mg PO daily that she had left over from an old script from 2021  She is on day 4 of her 40mg dose and states she is "doing much better"  She rates her depression at 5/10, anxiety is "not bad" and rated 4-5/10  She denies SI/HI, denies auditory and visual hallucinations  She does not endorse She states that she feels good on the 40mg PO daily and would like to continue that  She is also working towards weight loss surgery and understands that medications and surgery are just tools to help with her mental and physical health and understands she needs to work towards happiness as well  She is insightful and motivated for treatment at this time  Educated on compliance, and she verbally understands the importance of continuing her medications  She states she is sleeping 8-10 hours/night, and appetite is good  Mom is her main support and is a good support  She states she has been working and since being on the paxil again, she has had good concentration and focus  Will continue Paxil 40mg PO daily and follow-up in 2 weeks  She denies any side effects from current psychiatric medications  PLAN:  Continue Paxil 40mg PO daily  Continue trazodone 25mg PO HS PRN  Aware of 24 hour and weekend coverage for urgent situations accessed by calling [facility] main practice number  Medication management every 2 weeks  Aware of need to follow up with family physician for medical issues    There are no diagnoses linked to this encounter      Current Outpatient Medications on File Prior to Visit   Medication Sig Dispense Refill    Biotin 87440 MCG TABS Take by mouth      cholecalciferol (VITAMIN D3) 400 units tablet Take 400 Units by mouth daily      cyclobenzaprine (FLEXERIL) 10 mg tablet Take 1 tablet (10 mg total) by mouth 2 (two) times a day as needed for muscle spasms 20 tablet 0    Multiple Vitamin (MULTIVITAMIN ADULT PO) Take by mouth      Omega-3 Fatty Acids (fish oil) 1,000 mg Take 1,000 mg by mouth daily      PARoxetine (PAXIL) 30 mg tablet Take 1 tablet (30 mg total) by mouth every morning 30 tablet 1    traZODone (DESYREL) 50 mg tablet Take 0 5 tablets (25 mg total) by mouth daily at bedtime (Patient taking differently: Take 25 mg by mouth daily at bedtime as needed ) 30 tablet 0     No current facility-administered medications on file prior to visit  Psychotherapy Provided:     Individual psychotherapy provided: Yes  Counseling was provided during the session today for 16 minutes  Supportive counseling provided  Medications, treatment progress and treatment plan reviewed with Sariah  Medication changes discussed with Sariah  Medication education provided to NARGIS SCHWARZ  Recent stressor including ongoing anxiety and chronic mental illness discussed with Sariah  Coping strategies reviewed with Sariah  Importance of medication and treatment compliance reviewed with Sariah  Importance of follow up with family physician for medical issues reviewed with Sariah  Reassurance and supportive therapy provided  Crisis/safety plan discussed with Sariah  Will utilize crisis as needed  HPI ROS Appetite Changes and Sleep:     She reports adequate number of sleep hours (8-10 hours), normal appetite, normal energy level   Denies homicidal ideation, denies suicidal ideation    Review Of Systems:    HPI ROS:               Medication Side Effects:  denies     Depression (10 worst): 5/10 (Was 7/10)   Anxiety (10 worst): 4-5/10 (Was 10/10)   Safety concerns (SI, HI, etc): denies (Was denies)   Sleep: Good, 8-10 hours/night (Was difficulty initiating, fluctuating sleep patterns) Energy: good (Was good)   Appetite: good (Was good)     General normal    Personality no change in personality   Constitutional negative   ENT negative   Cardiovascular negative   Respiratory negative   Gastrointestinal negative   Genitourinary negative   Musculoskeletal negative   Integumentary negative   Neurological negative   Endocrine negative   Other Symptoms none, all other systems are negative     Mental Status Evaluation:    Appearance Adequate hygiene and grooming   Behavior calm and cooperative   Mood anxious and depressed  Depression Scale - 5 of 10 (0 = No depression)  Anxiety Scale - 4-5 of 10 (0 = No anxiety)   Speech Normal rate and volume   Affect mood-congruent   Thought Processes Goal directed and coherent   Thought Content Does not verbalize delusional material   Associations Tightly connected   Perceptual Disturbances Denies hallucinations and does not appear to be responding to internal stimuli   Risk Potential Suicidal/Homicidal Ideation - No evidence of suicidal or homicidal ideation and patient does not verbalize suicidal or homicidal ideation  Risk of Violence - No evidence of risk for violence found on assessment  Risk of Self Mutilation - No evidence of risk for self mutilation found on assessment   Orientation oriented to person, place, time/date and situation   Memory recent and remote memory grossly intact   Consciousness alert and awake   Attention/Concentration attention span and concentration are age appropriate   Insight improving   Judgement improving   Muscle Strength and Gait normal muscle strength and normal muscle tone, normal gait/station and normal balance   Motor Activity unable to assess today due to virtual visit   Language no difficulty naming common objects, no difficulty repeating a phrase, no difficulty writing a sentence   Fund of Knowledge adequate knowledge of current events  adequate fund of knowledge regarding past history  adequate fund of knowledge regarding vocabulary      Past Psychiatric History  Previous diagnoses include ADHD     Prior outpatient psychiatric treatment: Had seen a psychiatrist in [place], but cannot recall who or when      Prior therapy: Saw a therapist 3 years ago for approximately 3 visits, was not ready for therapy at the time      Prior inpatient psychiatric treatment: denies     Prior suicide attempts: denies     Prior self harm: denies     Prior violence or aggression: denies    Past Psychiatric History Update:     Inpatient Psychiatric Admission Since Last Encounter:   no  Changes to Outpatient Psychiatric Treatment Team:    no  Suicide Attempt Or Self Mutilation Since Last Encounter:   no  Incidence of Violent Behavior Since Last Encounter:   no    Traumatic History Update:     New Onset of Abuse Since Last Encounter:   no  Traumatic Events Since Last Encounter:   no    Past Medical History:    Past Medical History:   Diagnosis Date    ADHD (attention deficit hyperactivity disorder)      Past Medical History Pertinent Negatives:   Diagnosis Date Noted    Head injury 06/30/2021    Seizures ([facility] ) 06/30/2021    Suicide attempt ([facility] ) 06/30/2021     No past surgical history on file    No Known Allergies  Substance Abuse History:    Social History     Substance and Sexual Activity   Alcohol Use Yes    Alcohol/week: 1 0 standard drink    Types: 1 Shots of liquor per week    Comment: one drink/week     Social History     Substance and Sexual Activity   Drug Use Yes    Frequency: 21 0 times per week    Types: Marijuana    Comment: smokes 3-4 times/day     Social History:    Social History     Socioeconomic History    Marital status: Single     Spouse name: Not on file    Number of children: 0    Years of education: some college    Highest education level: 12th grade   Occupational History    Not on file   Tobacco Use    Smoking status: Former Smoker     Packs/day: 0 50     Types: Cigarettes     Quit date: 3/30/2021     Years since quittin 8    Smokeless tobacco: Never Used    Tobacco comment: stoped smoking cigarettes 1 month ago   Vaping Use    Vaping Use: Some days    Substances: Flavoring   Substance and Sexual Activity    Alcohol use: Yes     Alcohol/week: 1 0 standard drink     Types: 1 Shots of liquor per week     Comment: one drink/week    Drug use: Yes     Frequency: 21 0 times per week     Types: Marijuana     Comment: smokes 3-4 times/day    Sexual activity: Not Currently   Other Topics Concern    Not on file   Social History Narrative    Not on file     Social Determinants of Health     Financial Resource Strain: Low Risk     Difficulty of Paying Living Expenses: Not hard at all   Food Insecurity: No Food Insecurity    Worried About Running Out of Food in the Last Year: Never true    Jesusita of Food in the Last Year: Never true   Transportation Needs: No Transportation Needs    Lack of Transportation (Medical): No    Lack of Transportation (Non-Medical): No   Physical Activity: Inactive    Days of Exercise per Week: 0 days    Minutes of Exercise per Session: 0 min   Stress: Stress Concern Present    Feeling of Stress : To some extent   Social Connections: Not on file   Intimate Partner Violence: Not At Risk    Fear of Current or Ex-Partner: No    Emotionally Abused: No    Physically Abused: No    Sexually Abused: No   Housing Stability: Not on file     Family Psychiatric History:     Family History   Problem Relation Age of Onset    No Known Problems Mother     Prostate cancer Father     Diabetes Father     Depression Father     Anxiety disorder Father     Depression Sister     Anxiety disorder Sister      History Review: The following portions of the patient's history were reviewed and updated as appropriate: allergies, current medications, past family history, past medical history, past social history, past surgical history and problem list     OBJECTIVE:     Vital signs in last 24 hours:     There were no vitals filed for this visit  Laboratory Results:   Recent Labs (last 2 months):   Appointment on 01/13/2022   Component Date Value    WBC 01/13/2022 8 73     RBC 01/13/2022 4 73     Hemoglobin 01/13/2022 14 2     Hematocrit 01/13/2022 42 8     MCV 01/13/2022 91     MCH 01/13/2022 30 0     MCHC 01/13/2022 33 2     RDW 01/13/2022 12 4     Platelets 54/19/0919 326     MPV 01/13/2022 9 9     Sodium 01/13/2022 139     Potassium 01/13/2022 4 0     Chloride 01/13/2022 110*    CO2 01/13/2022 24     ANION GAP 01/13/2022 5     BUN 01/13/2022 11     Creatinine 01/13/2022 0 67     Glucose, Fasting 01/13/2022 83     Calcium 01/13/2022 8 9     AST 01/13/2022 10     ALT 01/13/2022 22     Alkaline Phosphatase 01/13/2022 63     Total Protein 01/13/2022 7 2     Albumin 01/13/2022 3 6     Total Bilirubin 01/13/2022 0 39     eGFR 01/13/2022 120     TSH 3RD GENERATON 01/13/2022 0 567     Nicotine 01/13/2022 <1 0     Cotinine 01/13/2022 65 5      I have personally reviewed all pertinent laboratory/tests results  Suicide/Homicide Risk Assessment:    Risk of Harm to Self:  The following ratings are based on assessment at the time of the interview  Recent Specific Risk Factors include: current depressive symptoms, current anxiety symptoms  Demographic risk factors include:   Historical Risk Factors include: chronic depression, chronic anxiety symptoms  Protective Factors: no current suicidal ideation, access to mental health treatment, compliant with mental health treatment, stable living environment, stable job, sense of determination, supportive girlfriend and mother  Based on today's Norjacey Zhang presents the following risk of harm to self: none    Risk of Harm to Others:   The following ratings are based on assessment at the time of the interview  Protective Factors: no current homicidal ideation  Based on today's assessment, Prabhu Carrion presents the following risk of harm to others: none    The following interventions are recommended: no intervention changes needed    Medications Risks/Benefits:      Risks, Benefits And Possible Side Effects Of Medications:    Discussed risks and benefits of treatment with patient including risk of suicidality, serotonin syndrome, increased QTc interval and SIADH related to treatment with antidepressants; Risk of induction of manic symptoms in certain patient populations     Controlled Medication Discussion:     No records found for controlled prescriptions according to [place] Prescription Drug Monitoring Program    Treatment Plan:    Due for update/Updated:   yes  Last treatment plan done 2/4/22 by SHAUNA Brothers  Treatment Plan due on 8/4/22  SHAUNA Gallagher 02/04/22    This note was shared with patient

## 2022-02-04 NOTE — BH TREATMENT PLAN
TREATMENT PLAN (Medication Management Only)        [facility]    Name and Date of Birth:  Harjinder Morrison 27 y o  1995  Date of Treatment Plan: February 4, 2022  Diagnosis/Diagnoses:    1  Current severe episode of major depressive disorder without psychotic features, unspecified whether recurrent [facility])      Strengths/Personal Resources for Self-Care: supportive family, ability to understand psychiatric illness  Area/Areas of need (in own words): anxiety symptoms, depressive symptoms  1  Long Term Goal: continue improvement in acceptable anxiety level  Target Date:6 months - 8/4/2022  Person/Persons responsible for completion of goal: Sariah  2  Short Term Objective (s) - How will we reach this goal?:   A  Provider new recommended medication/dosage changes and/or continue medication(s): continue current medications as prescribed  Natalie Perez all scheduled appointments  C  Take psychiatric medications responsibly  Target Date:6 months - 8/4/2022  Person/Persons Responsible for Completion of Goal: Sariah  Progress Towards Goals: continuing treatment  Treatment Modality: medication management every 2 weeks, medication education at every visit  Review due 180 days from date of this plan: 6 months - 8/4/2022  Expected length of service: ongoing treatment  My Physician/PA/NP and I have developed this plan together and I agree to work on the goals and objectives  I understand the treatment goals that were developed for my treatment      Treatment Plan done but not signed at time of office visit due to:  Plan reviewed by phone or in person  and verbal consent given due to Mag social niharika

## 2022-02-15 DIAGNOSIS — Z01.812 BLOOD TESTS PRIOR TO TREATMENT OR PROCEDURE: ICD-10-CM

## 2022-02-15 DIAGNOSIS — Z01.818 PREOPERATIVE CLEARANCE: ICD-10-CM

## 2022-02-15 DIAGNOSIS — E66.01 MORBID OBESITY WITH BMI OF 40.0-44.9, ADULT (HCC): Primary | ICD-10-CM

## 2022-02-15 DIAGNOSIS — F17.201 TOBACCO ABUSE, IN REMISSION: ICD-10-CM

## 2022-02-15 DIAGNOSIS — Z72.0 TOBACCO ABUSE: ICD-10-CM

## 2022-02-15 DIAGNOSIS — Z72.0 NICOTINE ABUSE: ICD-10-CM

## 2022-02-23 ENCOUNTER — OFFICE VISIT (OUTPATIENT)
Dept: BARIATRICS | Facility: CLINIC | Age: 27
End: 2022-02-23

## 2022-02-23 VITALS — WEIGHT: 254.6 LBS | BODY MASS INDEX: 41.09 KG/M2

## 2022-02-23 DIAGNOSIS — E66.01 OBESITY, CLASS III, BMI 40-49.9 (MORBID OBESITY) (HCC): Primary | ICD-10-CM

## 2022-02-23 PROCEDURE — RECHECK

## 2022-02-23 NOTE — PROGRESS NOTES
WT [facility]  Patient maintained her weight this month  Patient excited to get [employer] puppy next month that will help increase her activity, walking several times a day  Patient continues to do daily walk after dinner with or without her partner  Has increased energy and sleeping better  Paxil increased from 30 to 40 mg and feels better  She told her mom about having surgery and pleasantly surprised at mom being positive and supportive  Reviewed workflow; patient making progress preparing for surgery  Scheduled to see surgeon next month

## 2022-03-17 ENCOUNTER — OFFICE VISIT (OUTPATIENT)
Dept: BARIATRICS | Facility: CLINIC | Age: 27
End: 2022-03-17
Payer: COMMERCIAL

## 2022-03-17 VITALS
HEIGHT: 65 IN | SYSTOLIC BLOOD PRESSURE: 112 MMHG | WEIGHT: 258.5 LBS | DIASTOLIC BLOOD PRESSURE: 70 MMHG | TEMPERATURE: 97.2 F | HEART RATE: 83 BPM | BODY MASS INDEX: 43.07 KG/M2

## 2022-03-17 DIAGNOSIS — E66.01 MORBID (SEVERE) OBESITY DUE TO EXCESS CALORIES (HCC): Primary | ICD-10-CM

## 2022-03-17 PROCEDURE — 99203 OFFICE O/P NEW LOW 30 MIN: CPT | Performed by: SURGERY

## 2022-03-17 NOTE — PROGRESS NOTES
BARIATRIC CONSULT-INITIAL - BARIATRIC SURGERY  Meli Peacock 32 y o  female MRN: [id number]  Unit/Bed#:  Encounter: [id number]      HPI:  Meli Peacock is a 32 y o  female who presents with morbid obesity to discuss weight loss options  Review of Systems    Historical Information   Past Medical History:   Diagnosis Date    ADHD (attention deficit hyperactivity disorder)      History reviewed  No pertinent surgical history  Social History   Social History     Substance and Sexual Activity   Alcohol Use Yes    Alcohol/week: 1 0 standard drink    Types: 1 Shots of liquor per week    Comment: one drink/week     Social History     Substance and Sexual Activity   Drug Use Yes    Frequency: 21 0 times per week    Types: Marijuana    Comment: smokes 3-4 times/day     Social History     Tobacco Use   Smoking Status Former Smoker    Packs/day: 0 50    Types: Cigarettes    Quit date: 3/30/2021    Years since quittin 9   Smokeless Tobacco Never Used   Tobacco Comment    stoped smoking cigarettes 1 month ago     Family History: non-contributory    Meds/Allergies   all medications and allergies reviewed  No Known Allergies    Objective       Current Vitals:   Blood Pressure: 112/70 (22 1442)  Pulse: 83 (22 1442)  Temperature: (!) 97 2 °F (36 2 °C) (22 144)  Temp Source: Tympanic (22 1442)  Height: 5' 5" (165 1 cm) (22 144)  Weight - Scale: 117 kg (258 lb 8 oz) (22 144)  Body mass index is 43 02 kg/m²  Invasive Devices  Report    None                 Physical Exam    Lab Results: I have personally reviewed pertinent lab results  Imaging: I have personally reviewed pertinent reports  EKG, Pathology, and Other Studies: I have personally reviewed pertinent reports        Code Status: [unfilled]  Advance Directive and Living Will:      Power of :    POLST:      Assessment/PLAN:            Patient has a long history of morbid obesity and is presenting to discuss the surgical weight loss options  Despite the patient best efforts patient was unable to lose any meaningful or sustainable weight using nonsurgical means  We had a long discussion regarding all the surgical weight-loss options at our disposal at this point and reviewed the risks and benefits of each procedure in details as it relates to her age, BMI and medical conditions  Patient elected to undergo a sleeve gastrectomy    Risks and benefits were explained to the patient  We also discussed the importance and need of a preoperative workup to make sure that the patient can undergo the procedure safely  Preoperative workup includes sleep apnea screening, cardiac evaluation, nutrition/psych and preoperative EGD  Risks and benefits of all the preoperative diagnostic tests were discussed with the patient including but not limited to the upper endoscopy  Alternatives to surgery and alternative forms of surgery were also explained  Postsurgical commitment and aftercare programs were discussed and explained to the patient in details   In terms of comorbidities patient suffers mostly of   Past Medical History:   Diagnosis Date    ADHD (attention deficit hyperactivity disorder)        I informed the patient that the rate of resolution of comorbid conditions following weight loss surgery is between 60 and 90% depending on the severity of the specific medical condition  I discussed and educated the patient regarding the different components of our multidisciplinary program and the importance of compliance and follow-up in the postoperative period  All questions answered  Patient understands risks and benefits  An image of the procedure was also shown to the patient  After showing the image we discussed all the technical aspects of the procedure and also the potential complications including but not limited to gastrointestinal perforation, leak, obstruction, stricture and hemorrhage   I spent 30 min with the patient more than 50% of the time was spent educating the patient and coordinating care

## 2022-03-18 ENCOUNTER — PREP FOR PROCEDURE (OUTPATIENT)
Dept: BARIATRICS | Facility: CLINIC | Age: 27
End: 2022-03-18

## 2022-03-18 DIAGNOSIS — E66.01 MORBID OBESITY (HCC): Primary | ICD-10-CM

## 2022-04-22 ENCOUNTER — OFFICE VISIT (OUTPATIENT)
Dept: BARIATRICS | Facility: CLINIC | Age: 27
End: 2022-04-22

## 2022-04-22 VITALS — HEIGHT: 65 IN | WEIGHT: 267.3 LBS | BODY MASS INDEX: 44.54 KG/M2

## 2022-04-22 DIAGNOSIS — Z01.812 BLOOD TESTS PRIOR TO TREATMENT OR PROCEDURE: ICD-10-CM

## 2022-04-22 DIAGNOSIS — Z01.818 PREOPERATIVE CLEARANCE: ICD-10-CM

## 2022-04-22 DIAGNOSIS — E66.01 MORBID OBESITY WITH BMI OF 40.0-44.9, ADULT (HCC): Primary | ICD-10-CM

## 2022-04-22 PROCEDURE — RECHECK: Performed by: DIETITIAN, REGISTERED

## 2022-04-22 NOTE — PATIENT INSTRUCTIONS
Repeat nicotine test  Pre op blood work   Follow [id number] calorie meal plan   80 ounces or more of fluid   Complete lesson plans in book

## 2022-04-22 NOTE — PROGRESS NOTES
Bariatric Nutrition Follow-Up Note    Type of surgery    Preop  Surgery Date: TBD  Surgeon: Dr Sabrina Hampton is here today for pre op weight check and dietary counseling     Nutrition Assessment   Ron Shaw  32 y o   female     Wt with BMI of 25: 150lbs  Pre-Op Excess Wt: 102lbs  Ht 5' 5" (1 651 m)   Wt 121 kg (267 lb 4 8 oz)   BMI 44 48 kg/m²    Pt gained 8 8 pounds over past month - reports going on vacation to [facility] [place] and indulging     Wt Readings from Last 3 Encounters:   22 121 kg (267 lb 4 8 oz)   22 117 kg (258 lb 8 oz)   22 115 kg (254 lb 9 6 oz)         Charla Stein Equation:     Weight maintenance= 2249 kcal/day  Estimated calories for weight loss 0985-2817 kcal/day ( 1-2# per wk wt loss - sedentary )  Estimated protein needs 68 2-81 8 g/day (1 0-1 2 gms/kg IBW )   Estimated fluid needs 2459-7059 ml/day (30-35 ml/kg IBW )      Review of History and Medications   Past Medical History:   Diagnosis Date    ADHD (attention deficit hyperactivity disorder)      No past surgical history on file  Social History     Socioeconomic History    Marital status: Single     Spouse name: Not on file    Number of children: 0    Years of education: some college    Highest education level: 12th grade   Occupational History    Not on file   Tobacco Use    Smoking status: Former Smoker     Packs/day: 0 50     Types: Cigarettes     Quit date: 3/30/2021     Years since quittin 0    Smokeless tobacco: Never Used    Tobacco comment: stoped smoking cigarettes 1 month ago   Vaping Use    Vaping Use: Some days    Substances: Flavoring   Substance and Sexual Activity    Alcohol use:  Yes     Alcohol/week: 1 0 standard drink     Types: 1 Shots of liquor per week     Comment: one drink/week    Drug use: Yes     Frequency: 21 0 times per week     Types: Marijuana     Comment: smokes 3-4 times/day    Sexual activity: Not Currently   Other Topics Concern    Not on file   Social History Narrative    Not on file     Social Determinants of Health     Financial Resource Strain: Low Risk     Difficulty of Paying Living Expenses: Not hard at all   Food Insecurity: No Food Insecurity    Worried About Running Out of Food in the Last Year: Never true    [address] in the Last Year: Never true   Transportation Needs: No Transportation Needs    Lack of Transportation (Medical): No    Lack of Transportation (Non-Medical): No   Physical Activity: Inactive    Days of Exercise per Week: 0 days    Minutes of Exercise per Session: 0 min   Stress: Stress Concern Present    Feeling of Stress : To some extent   Social Connections: Not on file   Intimate Partner Violence: Not At Risk    Fear of Current or Ex-Partner: No    Emotionally Abused: No    Physically Abused: No    Sexually Abused: No   Housing Stability: Not on file       Current Outpatient Medications:     Biotin 84437 MCG TABS, Take by mouth, Disp: , Rfl:     cholecalciferol (VITAMIN D3) 400 units tablet, Take 400 Units by mouth daily, Disp: , Rfl:     cyclobenzaprine (FLEXERIL) 10 mg tablet, Take 1 tablet (10 mg total) by mouth 2 (two) times a day as needed for muscle spasms, Disp: 20 tablet, Rfl: 0    Multiple Vitamin (MULTIVITAMIN ADULT PO), Take by mouth, Disp: , Rfl:     Omega-3 Fatty Acids (fish oil) 1,000 mg, Take 1,000 mg by mouth daily, Disp: , Rfl:     PARoxetine (PAXIL) 40 MG tablet, Take 1 tablet (40 mg total) by mouth every morning, Disp: 30 tablet, Rfl: 2    traZODone (DESYREL) 50 mg tablet, Take 0 5 tablets (25 mg total) by mouth daily at bedtime (Patient taking differently: Take 25 mg by mouth daily at bedtime as needed ), Disp: 30 tablet, Rfl: 0     Food Intake and Lifestyle Assessment   Food Intake Assessment completed via usual diet recall  Drinks lots of water  Breakfast: Small entenmanns snack cake or trail mix  3 days per week gets mcdonalds hash brown and sausage    Snack: none   Lunch: skips or gets fast food:  About 50/50  [employer]onalds cheeseburger  Snack: none  Dinner: cooks: baked chicken, quinoa/sheela rice, vegetables:  brussl sprouts/broccoli/corn/carrots/zucchini  Snack: occasional sweet  Beverage intake: water, diet green tea, rare soda, occasional coffee with lots of sugar  Protein supplement: none  Estimated protein intake per day: 70-80g  Estimated fluid intake per day: >1 gallon water  Meals eaten away from home: mcdonalds approx 7 meals per week  Typical meal pattern: 2-3 meals per day and 0-1 snacks per day  Eating Behaviors: Consumption of high calorie/ high fat foods  Food allergies or intolerances: No Known AllergiesNKFA  Cultural or Yarsanism considerations: none noted    Physical Assessment  Physical Activity  Types of exercise: None  Physical job/lifting  Has [employer]- not comfortable in that setting right now  Current physical limitations: none    Psychosocial Assessment   Support systems: significant other  Socioeconomic factors: works for [employer]    Nutrition Diagnosis  Diagnosis: Overweight / Obesity (NC-3 3), Excessive energy intake (NI-1 5), Excessive fat intake (NI-5 6 2) and Undesirable food choices (NB-1 7)  Related to: Physical inactivity and Excessive energy intake  As Evidenced by: BMI >25, Excessive energy intake, Excessive fat / cholesterol intake and Unintentional weight gain     Nutrition Prescription: Recommend the following diet  Regular    Interventions and Teaching   Discussed pre-op and post-op nutrition guidelines  Patient educated and handouts provided    Surgical changes to stomach / GI  Capacity of post-surgery stomach  Diet progression  Adequate hydration  Sugar and fat restriction to decrease "dumping syndrome"  Fat restriction to decrease steatorrhea  Expected weight loss  Weight loss plateaus/ possibility of weight regain  Exercise  Suggestions for pre-op diet  Nutrition considerations after surgery  Protein supplements  Meal planning and preparation  Appropriate carbohydrate, protein, and fat intake, and food/fluid choices to maximize safe weight loss, nutrient intake, and tolerance   Dietary and lifestyle changes  Possible problems with poor eating habits  Techniques for self monitoring and keeping daily food journal  Potential for food intolerance after surgery, and ways to deal with them including: lactose intolerance, nausea, reflux, vomiting, diarrhea, food intolerance, appetite changes, gas  Vitamin / Mineral supplementation of Multivitamin with minerals and Vitamin D  Pt currently takes:  Fish Oil, biotin, Vitamin D, occasional Vitamin C    Patient is not currently pregnant and doesn't desire to become pregnant a minimum of one year post-op    Education provided to: patient  Barriers to learning: No barriers identified  Readiness to change: preparation  Prior research on procedure: pre-op class and friends or family  Comprehension: verbalizes understanding   Expected Compliance: good    Recommendations  Pt is an appropriate candidate for surgery  Yes    Workflow:   Psych and/or D+A Clearance: n/a    PCP Letter: done   Support Group: done   Surgeon Appt  Done    EGD 5/25/2022   Cardiac Risk Assessment done   Sleep Studies N/A   Blood work Need updated    Nicotine test failed, needs to repeat    6 Month Pre-Operative Program: N/A    Weight Loss gained 15 pounds       [id number]=062 4lbs    Evaluation / Monitoring  Dietitian to Monitor: Eating pattern as discussed Tolerance of nutrition prescription Body weight Lab values Physical activity Bowel pattern  Pt gained 8 8 pounds over the past month after vacation to [facility]   Admits to dietary indiscretions, as she felt it was her last "vacation before surgery" and wanted to enjoy her food  Reviewed that all foods are allowed in moderation after surgery  Pt was using medical marijuana , but smoking it with cigar wraps  As a result she showed positive for nicotine    Reviewed that in state of [place], smoking is not allowed, only vaping, tinctures or capsules  Affects of smoking marijuana has not been fully studied, and the recommendation is to use the tinctures, or capsules  Pt verbalized understanding  Also reviewed that patient has gained 15 pounds since starting the process  Patient admits that since returning from vacation she realizes she needs to be more mindful of her food choices and to start preparing herself for surgery  Discussed use of meal replacements and measuring food to limit food intake  Provided with samples of Ensure Max to sample as a meal replacement        Goals  Eliminate sugar sweetened beverages, Food journal, Exercise 30 minutes 5 times per week, Complete lession plans 1-6, Eat 3 meals per day and Eliminate mindless snacking   Repeat nicotine test  Pre op blood work   Follow 2162-7694 calorie meal plan   80 ounces or more of fluid   Complete lesson plans in book     F/U with LCSW next month       Time Spent:   30 minutes

## 2022-05-22 RX ORDER — SODIUM CHLORIDE 9 MG/ML
125 INJECTION, SOLUTION INTRAVENOUS CONTINUOUS
Status: CANCELLED | OUTPATIENT
Start: 2022-05-22

## 2022-05-23 ENCOUNTER — APPOINTMENT (OUTPATIENT)
Dept: LAB | Facility: HOSPITAL | Age: 27
End: 2022-05-23
Attending: SURGERY
Payer: COMMERCIAL

## 2022-05-23 ENCOUNTER — APPOINTMENT (OUTPATIENT)
Dept: LAB | Facility: HOSPITAL | Age: 27
End: 2022-05-23

## 2022-05-23 DIAGNOSIS — Z01.818 PREOPERATIVE CLEARANCE: ICD-10-CM

## 2022-05-23 DIAGNOSIS — Z00.8 ENCOUNTER FOR OTHER GENERAL EXAMINATION: ICD-10-CM

## 2022-05-23 DIAGNOSIS — Z72.0 NICOTINE ABUSE: ICD-10-CM

## 2022-05-23 DIAGNOSIS — Z01.812 BLOOD TESTS PRIOR TO TREATMENT OR PROCEDURE: ICD-10-CM

## 2022-05-23 DIAGNOSIS — E66.01 MORBID OBESITY WITH BMI OF 40.0-44.9, ADULT (HCC): ICD-10-CM

## 2022-05-23 DIAGNOSIS — Z72.0 TOBACCO ABUSE: ICD-10-CM

## 2022-05-23 DIAGNOSIS — F17.201 TOBACCO ABUSE, IN REMISSION: ICD-10-CM

## 2022-05-23 LAB
ALBUMIN SERPL BCP-MCNC: 3.4 G/DL (ref 3.5–5)
ALP SERPL-CCNC: 74 U/L (ref 46–116)
ALT SERPL W P-5'-P-CCNC: 48 U/L (ref 12–78)
ANION GAP SERPL CALCULATED.3IONS-SCNC: 1 MMOL/L (ref 4–13)
AST SERPL W P-5'-P-CCNC: 20 U/L (ref 5–45)
BILIRUB SERPL-MCNC: 0.27 MG/DL (ref 0.2–1)
BUN SERPL-MCNC: 13 MG/DL (ref 5–25)
CALCIUM ALBUM COR SERPL-MCNC: 9.9 MG/DL (ref 8.3–10.1)
CALCIUM SERPL-MCNC: 9.4 MG/DL (ref 8.3–10.1)
CHLORIDE SERPL-SCNC: 106 MMOL/L (ref 100–108)
CHOLEST SERPL-MCNC: 148 MG/DL
CO2 SERPL-SCNC: 32 MMOL/L (ref 21–32)
CREAT SERPL-MCNC: 0.74 MG/DL (ref 0.6–1.3)
ERYTHROCYTE [DISTWIDTH] IN BLOOD BY AUTOMATED COUNT: 12.3 % (ref 11.6–15.1)
EST. AVERAGE GLUCOSE BLD GHB EST-MCNC: 91 MG/DL
GFR SERPL CREATININE-BSD FRML MDRD: 109 ML/MIN/1.73SQ M
GLUCOSE SERPL-MCNC: 97 MG/DL (ref 65–140)
HBA1C MFR BLD: 4.8 %
HCT VFR BLD AUTO: 41 % (ref 34.8–46.1)
HDLC SERPL-MCNC: 43 MG/DL
HGB BLD-MCNC: 13.8 G/DL (ref 11.5–15.4)
LDLC SERPL CALC-MCNC: 64 MG/DL (ref 0–100)
MCH RBC QN AUTO: 30.3 PG (ref 26.8–34.3)
MCHC RBC AUTO-ENTMCNC: 33.7 G/DL (ref 31.4–37.4)
MCV RBC AUTO: 90 FL (ref 82–98)
NONHDLC SERPL-MCNC: 105 MG/DL
PLATELET # BLD AUTO: 314 THOUSANDS/UL (ref 149–390)
PMV BLD AUTO: 9.6 FL (ref 8.9–12.7)
POTASSIUM SERPL-SCNC: 3.9 MMOL/L (ref 3.5–5.3)
PROT SERPL-MCNC: 6.9 G/DL (ref 6.4–8.2)
RBC # BLD AUTO: 4.56 MILLION/UL (ref 3.81–5.12)
SODIUM SERPL-SCNC: 139 MMOL/L (ref 136–145)
TRIGL SERPL-MCNC: 203 MG/DL
WBC # BLD AUTO: 10.91 THOUSAND/UL (ref 4.31–10.16)

## 2022-05-23 PROCEDURE — 36415 COLL VENOUS BLD VENIPUNCTURE: CPT

## 2022-05-23 PROCEDURE — 80323 ALKALOIDS NOS: CPT

## 2022-05-23 PROCEDURE — 80053 COMPREHEN METABOLIC PANEL: CPT

## 2022-05-23 PROCEDURE — 85027 COMPLETE CBC AUTOMATED: CPT

## 2022-05-23 PROCEDURE — 80061 LIPID PANEL: CPT

## 2022-05-23 PROCEDURE — 83036 HEMOGLOBIN GLYCOSYLATED A1C: CPT

## 2022-05-25 ENCOUNTER — ANESTHESIA EVENT (OUTPATIENT)
Dept: GASTROENTEROLOGY | Facility: HOSPITAL | Age: 27
End: 2022-05-25

## 2022-05-25 ENCOUNTER — ANESTHESIA (OUTPATIENT)
Dept: GASTROENTEROLOGY | Facility: HOSPITAL | Age: 27
End: 2022-05-25

## 2022-05-25 ENCOUNTER — HOSPITAL ENCOUNTER (OUTPATIENT)
Dept: GASTROENTEROLOGY | Facility: HOSPITAL | Age: 27
Setting detail: OUTPATIENT SURGERY
Discharge: HOME | End: 2022-05-25
Attending: SURGERY | Admitting: SURGERY
Payer: COMMERCIAL

## 2022-05-25 VITALS
SYSTOLIC BLOOD PRESSURE: 110 MMHG | OXYGEN SATURATION: 97 % | BODY MASS INDEX: 44.48 KG/M2 | DIASTOLIC BLOOD PRESSURE: 62 MMHG | TEMPERATURE: 98.6 F | HEIGHT: 65 IN | RESPIRATION RATE: 18 BRPM | WEIGHT: 267 LBS | HEART RATE: 76 BPM

## 2022-05-25 DIAGNOSIS — E66.01 MORBID OBESITY (HCC): ICD-10-CM

## 2022-05-25 LAB
EXT PREGNANCY TEST URINE: NEGATIVE
EXT. CONTROL: NORMAL

## 2022-05-25 PROCEDURE — 88305 TISSUE EXAM BY PATHOLOGIST: CPT | Performed by: PATHOLOGY

## 2022-05-25 PROCEDURE — 81025 URINE PREGNANCY TEST: CPT | Performed by: ANESTHESIOLOGY

## 2022-05-25 PROCEDURE — 43239 EGD BIOPSY SINGLE/MULTIPLE: CPT | Performed by: SURGERY

## 2022-05-25 RX ORDER — PROPOFOL 10 MG/ML
INJECTION, EMULSION INTRAVENOUS AS NEEDED
Status: DISCONTINUED | OUTPATIENT
Start: 2022-05-25 | End: 2022-05-25

## 2022-05-25 RX ORDER — LIDOCAINE HYDROCHLORIDE 20 MG/ML
INJECTION, SOLUTION EPIDURAL; INFILTRATION; INTRACAUDAL; PERINEURAL AS NEEDED
Status: DISCONTINUED | OUTPATIENT
Start: 2022-05-25 | End: 2022-05-25

## 2022-05-25 RX ORDER — SODIUM CHLORIDE 9 MG/ML
125 INJECTION, SOLUTION INTRAVENOUS CONTINUOUS
Status: DISCONTINUED | OUTPATIENT
Start: 2022-05-25 | End: 2022-05-29 | Stop reason: HOSPADM

## 2022-05-25 RX ADMIN — SODIUM CHLORIDE 125 ML/HR: 0.9 INJECTION, SOLUTION INTRAVENOUS at 09:05

## 2022-05-25 RX ADMIN — LIDOCAINE HYDROCHLORIDE 100 MG: 20 INJECTION, SOLUTION EPIDURAL; INFILTRATION; INTRACAUDAL at 09:14

## 2022-05-25 RX ADMIN — PROPOFOL 200 MG: 10 INJECTION, EMULSION INTRAVENOUS at 09:14

## 2022-05-25 NOTE — ANESTHESIA PREPROCEDURE EVALUATION
Procedure:  EGD    Relevant Problems   NEURO/PSYCH   (+) Major depressive disorder with current active episode   (+) Severe anxiety      Other   (+) ADHD   (+) Morbid obesity with BMI of 40 0-44 9, adult [facility])        Physical Exam    Airway    Mallampati score: II  TM Distance: >3 FB  Neck ROM: full     Dental       Cardiovascular  Rhythm: regular, Rate: normal,     Pulmonary  Breath sounds clear to auscultation,     Other Findings        Anesthesia Plan  ASA Score- 3     Anesthesia Type- general with ASA Monitors  Additional Monitors:   Airway Plan:           Plan Factors-Exercise tolerance (METS): >4 METS  Chart reviewed  Existing labs reviewed  Patient summary reviewed  Patient is not a current smoker  Patient not instructed to abstain from smoking on day of procedure  Patient did not smoke on day of surgery  Obstructive sleep apnea risk education given perioperatively  Induction- intravenous  Postoperative Plan-     Informed Consent- Anesthetic plan and risks discussed with patient

## 2022-05-25 NOTE — H&P
H&P EXAM - Outpatient Endoscopy  AL [address] [facility] GI LAB INTRA   [place] 32 y o  female MRN: [id number]  Unit/Bed#:  Encounter: [id number]        Impression: Morbid obesity    Plan:Upper endoscopy and a biopsy to rule out H  Pylori    Chief Complaint: Morbid obesity and preoperative endoscopy    Physical Exam: Normal not in acute distress   Chest: Clear to auscultation   Heart: Normal S1 and S2

## 2022-05-25 NOTE — ANESTHESIA POSTPROCEDURE EVALUATION
Post-Op Assessment Note    CV Status:  Stable    Pain management: adequate     Mental Status:  Alert and awake   Hydration Status:  Euvolemic   PONV Controlled:  Controlled   Airway Patency:  Patent      Post Op Vitals Reviewed: Yes      Staff: Anesthesiologist         No complications documented      /71 (05/25/22 0921)    Temp      Pulse 81 (05/25/22 0921)   Resp 20 (05/25/22 0921)    SpO2 98 % (05/25/22 0921)

## 2022-05-27 ENCOUNTER — OFFICE VISIT (OUTPATIENT)
Dept: BARIATRICS | Facility: CLINIC | Age: 27
End: 2022-05-27

## 2022-05-27 VITALS — BODY MASS INDEX: 44.14 KG/M2 | WEIGHT: 264.9 LBS | HEIGHT: 65 IN

## 2022-05-27 DIAGNOSIS — E66.01 MORBID OBESITY WITH BMI OF 40.0-44.9, ADULT (HCC): Primary | ICD-10-CM

## 2022-05-27 PROCEDURE — RECHECK: Performed by: DIETITIAN, REGISTERED

## 2022-05-27 NOTE — PROGRESS NOTES
Bariatric Nutrition Follow-Up Note    Type of surgery    Preop  Surgery Date: TBD  Surgeon: Dr Juan Stanton is here today for pre op weight check and dietary counseling     Nutrition Assessment   [facility]  32 y o   female     Wt with BMI of 25: 150lbs  Pre-Op Excess Wt: 102lbs      Ht 5' 5" (1 651 m)   Wt 120 kg (264 lb 14 4 oz)   BMI 44 08 kg/m²    Pt lost  2 5# since last month   Overall gained 13# since starting the process  Wt Readings from Last 3 Encounters:   22 121 kg (267 lb)   22 121 kg (267 lb 4 8 oz)   22 117 kg (258 lb 8 oz)         Charla Stein Equation:     Weight maintenance= 2249 kcal/day  Estimated calories for weight loss 4974-2585 kcal/day ( 1-2# per wk wt loss - sedentary )  Estimated protein needs 68 2-81 8 g/day (1 0-1 2 gms/kg IBW )   Estimated fluid needs 4829-9992 ml/day (30-35 ml/kg IBW )      Review of History and Medications   Past Medical History:   Diagnosis Date    ADHD (attention deficit hyperactivity disorder)      No past surgical history on file  Social History     Socioeconomic History    Marital status: Single     Spouse name: Not on file    Number of children: 0    Years of education: some college    Highest education level: 12th grade   Occupational History    Not on file   Tobacco Use    Smoking status: Former Smoker     Packs/day: 0 50     Types: Cigarettes     Quit date: 3/30/2021     Years since quittin 1    Smokeless tobacco: Never Used    Tobacco comment: stoped smoking cigarettes 1 month ago   Vaping Use    Vaping Use: Some days    Substances: Flavoring   Substance and Sexual Activity    Alcohol use:  Yes     Alcohol/week: 1 0 standard drink     Types: 1 Shots of liquor per week     Comment: one drink/week    Drug use: Yes     Frequency: 21 0 times per week     Types: Marijuana     Comment: smokes 3-4 times/day    Sexual activity: Not Currently   Other Topics Concern    Not on file   Social History Narrative    Not on file     Social Determinants of Health     Financial Resource Strain: Not on file   Food Insecurity: Not on file   Transportation Needs: Not on file   Physical Activity: Not on file   Stress: Not on file   Social Connections: Not on file   Intimate Partner Violence: Not At Risk    Fear of Current or Ex-Partner: No    Emotionally Abused: No    Physically Abused: No    Sexually Abused: No   Housing Stability: Not on file       Current Outpatient Medications:     Biotin 56648 MCG TABS, Take by mouth, Disp: , Rfl:     cholecalciferol (VITAMIN D3) 400 units tablet, Take 400 Units by mouth daily, Disp: , Rfl:     cyclobenzaprine (FLEXERIL) 10 mg tablet, Take 1 tablet (10 mg total) by mouth 2 (two) times a day as needed for muscle spasms, Disp: 20 tablet, Rfl: 0    Multiple Vitamin (MULTIVITAMIN ADULT PO), Take 1 tablet by mouth in the morning, Disp: , Rfl:     Omega-3 Fatty Acids (fish oil) 1,000 mg, Take 1,000 mg by mouth daily, Disp: , Rfl:     PARoxetine (PAXIL) 40 MG tablet, Take 1 tablet (40 mg total) by mouth every morning, Disp: 30 tablet, Rfl: 2    traZODone (DESYREL) 50 mg tablet, Take 0 5 tablets (25 mg total) by mouth daily at bedtime (Patient taking differently: Take 25 mg by mouth daily at bedtime as needed), Disp: 30 tablet, Rfl: 0  No current facility-administered medications for this visit  Facility-Administered Medications Ordered in Other Visits:     sodium chloride 0 9 % infusion, 125 mL/hr, Intravenous, Continuous, Agustin Boyce DO, Last Rate: 125 mL/hr at 05/25/22 0907, Restarted at 05/25/22 0859     Food Intake and Lifestyle Assessment   Food Intake Assessment completed via usual diet recall  Drinks lots of water      Breakfast: Premier shake with coffee   Snack: none   Lunch: purchases  Pre made salad with protein, or on occasion will get a fried chicken sandwich and skip the bun   Or turkey and cheese cold cuts with cucumbers and carrots   Drive thru once per week - sandwich and fries and shake  Snack: none  Dinner: cooks:  4 pm baked chicken, quinoa/sheela rice, vegetables:  brussl sprouts/broccoli/corn/carrots/zucchini  Has stopped eating red meat   Snack: quest protein bar or frozen grapes   Beverage intake: water, diet green tea, coffee with premier protein   Protein supplement: none  Estimated protein intake per day: 70-80g  Estimated fluid intake per day: >1 gallon water  Meals eaten away from home: Has decreased from 7 days per week to 2-3 times per week for drive thru  Typical meal pattern: 2-3 meals per day and 0-1 snacks per day  Eating Behaviors: Consumption of high calorie/ high fat foods  Food allergies or intolerances: No Known AllergiesNKFA  Cultural or Hinduism considerations: none noted    Physical Assessment  Physical Activity  Types of exercise: None  Physical job/lifting  Has [employer]- not comfortable in that setting right now  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   Walks dog about twice per week along trail   Current physical limitations: none    Psychosocial Assessment   Support systems: significant other  Socioeconomic factors: works for [employer] as  , helps with showers     Nutrition Diagnosis  Diagnosis: Overweight / Obesity (NC-3 3), Excessive energy intake (NI-1 5), Excessive fat intake (NI-5 6 2) and Undesirable food choices (NB-1 7)  Related to: Physical inactivity and Excessive energy intake  As Evidenced by: BMI >25, Excessive energy intake, Excessive fat / cholesterol intake and Unintentional weight gain     Nutrition Prescription: Recommend the following diet  Regular    Interventions and Teaching   Discussed pre-op and post-op nutrition guidelines  Patient educated and handouts provided    Surgical changes to stomach / GI  Capacity of post-surgery stomach  Diet progression  Adequate hydration  Sugar and fat restriction to decrease "dumping syndrome"  Fat restriction to decrease steatorrhea  Expected weight loss  Weight loss plateaus/ possibility of weight regain  Exercise  Suggestions for pre-op diet  Nutrition considerations after surgery  Protein supplements  Meal planning and preparation  Appropriate carbohydrate, protein, and fat intake, and food/fluid choices to maximize safe weight loss, nutrient intake, and tolerance   Dietary and lifestyle changes  Possible problems with poor eating habits  Techniques for self monitoring and keeping daily food journal  Potential for food intolerance after surgery, and ways to deal with them including: lactose intolerance, nausea, reflux, vomiting, diarrhea, food intolerance, appetite changes, gas  Vitamin / Mineral supplementation of Multivitamin with minerals and Vitamin D  Pt currently takes:  Fish Oil, biotin, Vitamin D, occasional Vitamin C  Patient is not currently pregnant and doesn't desire to become pregnant a minimum of one year post-op    Education provided to: patient  Barriers to learning: No barriers identified  Readiness to change: preparation  Prior research on procedure: pre-op class and friends or family  Comprehension: verbalizes understanding   Expected Compliance: good    Recommendations  Pt is an appropriate candidate for surgery  Yes    Workflow:   Psych and/or D+A Clearance: n/a    PCP Letter: done   Support Group: done   Surgeon Appt  Done    EGD 5/25/2022   Cardiac Risk Assessment done   Sleep Studies N/A   Blood work Done    Nicotine test failed, pending -    6 Month Pre-Operative Program: N/A    Weight Loss gained 15 pounds       [id number] 4lbs    Evaluation / Monitoring  Dietitian to Monitor: Eating pattern as discussed Tolerance of nutrition prescription Body weight Lab values Physical activity Bowel pattern  Pt lost 2 5 pounds over the last month but making some dietary changes  She no longer eats drive thru for breakfast  She has replaced with protein shake mixed with coffee    She has reduced her drive thru for lunch, and will pack premade lunches on occasion  She has eliminated red meat from her diet and only eats chicken or fish and vegetables for dinner  For her evening snack, in stead of sweets she has a Quest protein bar  Reviewed that patient has made good progress with lifestyle change but has gained 13 pounds since starting the process  Patient admits that she uses a smaller plate at dinner but still frequently eats seconds  She consistently drinks 80 ounces or more of fluid per day     Pt went for repeat nicotine test and level is pending     Goals  Eliminate sugar sweetened beverages, Food journal, Exercise 30 minutes 5 times per week, Complete lession plans 1-6, Eat 3 meals per day and Eliminate mindless snacking   Follow 3283-7504 calorie meal plan    B 150 calories ( protein shake )   L 400 calories    D 400 calories    S 250 calories   80 ounces or more of fluid   Continue to read lesson plans in her bariatric book     Scheduled next month with LSW          Time Spent:   30 minutes

## 2022-05-31 LAB
COTININE SERPL-MCNC: <1 NG/ML
NICOTINE SERPL-MCNC: <1 NG/ML

## 2022-06-09 ENCOUNTER — TELEPHONE (OUTPATIENT)
Dept: BARIATRICS | Facility: CLINIC | Age: 27
End: 2022-06-09

## 2022-06-09 NOTE — TELEPHONE ENCOUNTER
Patient was called I explained to her she went to soon her her cardiac clearance she went in Dec it is only valid for 6 months and it is now  she must call cardiology to have an updated EKG and written clearance she can proceed with Bariatric surgery once that has been completed I can submit her case   Cu

## 2022-06-23 DIAGNOSIS — E66.01 MORBID OBESITY (HCC): Primary | ICD-10-CM

## 2022-06-27 ENCOUNTER — TELEPHONE (OUTPATIENT)
Dept: BARIATRICS | Facility: CLINIC | Age: 27
End: 2022-06-27

## 2022-06-27 NOTE — TELEPHONE ENCOUNTER
Chelsy outreached to [facility] a v m  to schedule her appointment  Contact information was provided for the main  office and Chelsy direct number

## 2022-07-14 ENCOUNTER — TELEPHONE (OUTPATIENT)
Dept: DERMATOLOGY | Facility: CLINIC | Age: 27
End: 2022-07-14

## 2022-07-14 NOTE — TELEPHONE ENCOUNTER
Called pt a second time in regards of rescheduling her appt from aug 11th to aug 4th, same time, location & provider  Lvm with our cb number

## 2022-07-21 ENCOUNTER — TELEPHONE (OUTPATIENT)
Dept: DERMATOLOGY | Facility: CLINIC | Age: 27
End: 2022-07-21

## 2022-07-21 NOTE — TELEPHONE ENCOUNTER
Called pt a third time in regards of rescheduling her appt from aug 11th to aug 4th, same time, location & provider  Lvm stating that her appt is cancelled